# Patient Record
Sex: FEMALE | Race: WHITE | NOT HISPANIC OR LATINO | Employment: UNEMPLOYED | URBAN - METROPOLITAN AREA
[De-identification: names, ages, dates, MRNs, and addresses within clinical notes are randomized per-mention and may not be internally consistent; named-entity substitution may affect disease eponyms.]

---

## 2017-03-29 ENCOUNTER — HOSPITAL ENCOUNTER (INPATIENT)
Facility: HOSPITAL | Age: 62
LOS: 7 days | Discharge: NURSING, REHAB OR LONG-TERM CARE | DRG: 065 | End: 2017-04-05
Attending: EMERGENCY MEDICINE | Admitting: INTERNAL MEDICINE
Payer: MEDICAID

## 2017-03-29 DIAGNOSIS — Z72.0 TOBACCO ABUSE: ICD-10-CM

## 2017-03-29 DIAGNOSIS — I63.9 ISCHEMIC STROKE: ICD-10-CM

## 2017-03-29 DIAGNOSIS — J44.9 CHRONIC OBSTRUCTIVE PULMONARY DISEASE, UNSPECIFIED COPD TYPE: ICD-10-CM

## 2017-03-29 DIAGNOSIS — I63.9 CVA (CEREBRAL VASCULAR ACCIDENT): ICD-10-CM

## 2017-03-29 DIAGNOSIS — I10 ESSENTIAL HYPERTENSION: ICD-10-CM

## 2017-03-29 DIAGNOSIS — I63.9 ACUTE CVA (CEREBROVASCULAR ACCIDENT): ICD-10-CM

## 2017-03-29 DIAGNOSIS — R47.01 EXPRESSIVE APHASIA: Primary | ICD-10-CM

## 2017-03-29 LAB
ALBUMIN SERPL BCP-MCNC: 4.2 G/DL
ALLENS TEST: ABNORMAL
ALP SERPL-CCNC: 128 U/L
ALT SERPL W/O P-5'-P-CCNC: 18 U/L
ANION GAP SERPL CALC-SCNC: 11 MMOL/L
APTT BLDCRRT: 26.9 SEC
AST SERPL-CCNC: 22 U/L
BASOPHILS # BLD AUTO: 0.06 K/UL
BASOPHILS NFR BLD: 0.4 %
BILIRUB SERPL-MCNC: 0.6 MG/DL
BUN SERPL-MCNC: 15 MG/DL
CALCIUM SERPL-MCNC: 9.4 MG/DL
CHLORIDE SERPL-SCNC: 105 MMOL/L
CO2 SERPL-SCNC: 22 MMOL/L
CREAT SERPL-MCNC: 1 MG/DL
DELSYS: ABNORMAL
DIFFERENTIAL METHOD: ABNORMAL
EOSINOPHIL # BLD AUTO: 0.2 K/UL
EOSINOPHIL NFR BLD: 1.5 %
ERYTHROCYTE [DISTWIDTH] IN BLOOD BY AUTOMATED COUNT: 13 %
EST. GFR  (AFRICAN AMERICAN): >60 ML/MIN/1.73 M^2
EST. GFR  (NON AFRICAN AMERICAN): >60 ML/MIN/1.73 M^2
FIO2: 28
FLOW: 2
GLUCOSE SERPL-MCNC: 134 MG/DL
HCO3 UR-SCNC: 23.5 MMOL/L (ref 24–28)
HCT VFR BLD AUTO: 51.1 %
HGB BLD-MCNC: 18.2 G/DL
INR PPP: 1
LYMPHOCYTES # BLD AUTO: 3.5 K/UL
LYMPHOCYTES NFR BLD: 23.8 %
MCH RBC QN AUTO: 31 PG
MCHC RBC AUTO-ENTMCNC: 35.6 %
MCV RBC AUTO: 87 FL
MODE: ABNORMAL
MONOCYTES # BLD AUTO: 1.5 K/UL
MONOCYTES NFR BLD: 10.2 %
NEUTROPHILS # BLD AUTO: 9.3 K/UL
NEUTROPHILS NFR BLD: 64.1 %
PCO2 BLDA: 43.7 MMHG (ref 35–45)
PH SMN: 7.34 [PH] (ref 7.35–7.45)
PLATELET # BLD AUTO: 205 K/UL
PMV BLD AUTO: 10.2 FL
PO2 BLDA: 76 MMHG (ref 80–100)
POC BE: -2 MMOL/L
POC SATURATED O2: 94 % (ref 95–100)
POCT GLUCOSE: 105 MG/DL (ref 70–110)
POTASSIUM SERPL-SCNC: 4.5 MMOL/L
PROT SERPL-MCNC: 7.8 G/DL
PROTHROMBIN TIME: 10.7 SEC
RBC # BLD AUTO: 5.87 M/UL
SAMPLE: ABNORMAL
SITE: ABNORMAL
SODIUM SERPL-SCNC: 138 MMOL/L
TROPONIN I SERPL DL<=0.01 NG/ML-MCNC: <0.006 NG/ML
WBC # BLD AUTO: 14.55 K/UL

## 2017-03-29 PROCEDURE — 96375 TX/PRO/DX INJ NEW DRUG ADDON: CPT

## 2017-03-29 PROCEDURE — 96374 THER/PROPH/DIAG INJ IV PUSH: CPT

## 2017-03-29 PROCEDURE — 11000001 HC ACUTE MED/SURG PRIVATE ROOM

## 2017-03-29 PROCEDURE — 85025 COMPLETE CBC W/AUTO DIFF WBC: CPT

## 2017-03-29 PROCEDURE — 36600 WITHDRAWAL OF ARTERIAL BLOOD: CPT

## 2017-03-29 PROCEDURE — 80053 COMPREHEN METABOLIC PANEL: CPT

## 2017-03-29 PROCEDURE — 82962 GLUCOSE BLOOD TEST: CPT

## 2017-03-29 PROCEDURE — 99202 OFFICE O/P NEW SF 15 MIN: CPT | Mod: GT,,, | Performed by: PSYCHIATRY & NEUROLOGY

## 2017-03-29 PROCEDURE — 99900035 HC TECH TIME PER 15 MIN (STAT)

## 2017-03-29 PROCEDURE — 25000003 PHARM REV CODE 250: Performed by: EMERGENCY MEDICINE

## 2017-03-29 PROCEDURE — 93005 ELECTROCARDIOGRAM TRACING: CPT

## 2017-03-29 PROCEDURE — 85610 PROTHROMBIN TIME: CPT

## 2017-03-29 PROCEDURE — 93010 ELECTROCARDIOGRAM REPORT: CPT | Mod: ,,, | Performed by: INTERNAL MEDICINE

## 2017-03-29 PROCEDURE — 82803 BLOOD GASES ANY COMBINATION: CPT

## 2017-03-29 PROCEDURE — 99285 EMERGENCY DEPT VISIT HI MDM: CPT | Mod: 25

## 2017-03-29 PROCEDURE — 85730 THROMBOPLASTIN TIME PARTIAL: CPT

## 2017-03-29 PROCEDURE — 63600175 PHARM REV CODE 636 W HCPCS: Performed by: EMERGENCY MEDICINE

## 2017-03-29 PROCEDURE — 84484 ASSAY OF TROPONIN QUANT: CPT

## 2017-03-29 RX ORDER — PRAVASTATIN SODIUM 20 MG/1
20 TABLET ORAL DAILY
Status: ON HOLD | COMMUNITY
End: 2017-04-05 | Stop reason: HOSPADM

## 2017-03-29 RX ORDER — ALBUTEROL SULFATE 0.83 MG/ML
2.5 SOLUTION RESPIRATORY (INHALATION) EVERY 6 HOURS PRN
COMMUNITY

## 2017-03-29 RX ORDER — LORAZEPAM 2 MG/ML
1 INJECTION INTRAMUSCULAR
Status: COMPLETED | OUTPATIENT
Start: 2017-03-30 | End: 2017-03-29

## 2017-03-29 RX ORDER — ASPIRIN 300 MG/1
300 SUPPOSITORY RECTAL
Status: COMPLETED | OUTPATIENT
Start: 2017-03-29 | End: 2017-03-29

## 2017-03-29 RX ORDER — LISINOPRIL 20 MG/1
20 TABLET ORAL DAILY
COMMUNITY

## 2017-03-29 RX ORDER — LORAZEPAM 2 MG/ML
1 INJECTION INTRAMUSCULAR
Status: COMPLETED | OUTPATIENT
Start: 2017-03-29 | End: 2017-03-29

## 2017-03-29 RX ORDER — TIOTROPIUM BROMIDE 18 UG/1
18 CAPSULE ORAL; RESPIRATORY (INHALATION) DAILY
COMMUNITY

## 2017-03-29 RX ORDER — FLUTICASONE FUROATE AND VILANTEROL 100; 25 UG/1; UG/1
1 POWDER RESPIRATORY (INHALATION) DAILY
COMMUNITY

## 2017-03-29 RX ADMIN — ASPIRIN 300 MG: 300 SUPPOSITORY RECTAL at 11:03

## 2017-03-29 RX ADMIN — LORAZEPAM 1 MG: 2 INJECTION, SOLUTION INTRAMUSCULAR; INTRAVENOUS at 11:03

## 2017-03-30 PROBLEM — Z72.0 TOBACCO ABUSE: Status: ACTIVE | Noted: 2017-03-30

## 2017-03-30 LAB
ALBUMIN SERPL BCP-MCNC: 3.9 G/DL
ALP SERPL-CCNC: 113 U/L
ALT SERPL W/O P-5'-P-CCNC: 19 U/L
ANION GAP SERPL CALC-SCNC: 14 MMOL/L
AST SERPL-CCNC: 19 U/L
BASOPHILS # BLD AUTO: 0.06 K/UL
BASOPHILS NFR BLD: 0.4 %
BILIRUB SERPL-MCNC: 0.7 MG/DL
BILIRUB UR QL STRIP: ABNORMAL
BUN SERPL-MCNC: 18 MG/DL
CALCIUM SERPL-MCNC: 9.1 MG/DL
CHLORIDE SERPL-SCNC: 106 MMOL/L
CHOLEST/HDLC SERPL: 4.3 {RATIO}
CLARITY UR: CLEAR
CO2 SERPL-SCNC: 21 MMOL/L
COLOR UR: YELLOW
CREAT SERPL-MCNC: 1 MG/DL
DIASTOLIC DYSFUNCTION: YES
DIFFERENTIAL METHOD: ABNORMAL
EOSINOPHIL # BLD AUTO: 0.1 K/UL
EOSINOPHIL NFR BLD: 0.4 %
ERYTHROCYTE [DISTWIDTH] IN BLOOD BY AUTOMATED COUNT: 13.2 %
EST. GFR  (AFRICAN AMERICAN): >60 ML/MIN/1.73 M^2
EST. GFR  (NON AFRICAN AMERICAN): >60 ML/MIN/1.73 M^2
GLUCOSE SERPL-MCNC: 104 MG/DL
GLUCOSE UR QL STRIP: NEGATIVE
HCT VFR BLD AUTO: 47.6 %
HDL/CHOLESTEROL RATIO: 23.2 %
HDLC SERPL-MCNC: 185 MG/DL
HDLC SERPL-MCNC: 43 MG/DL
HGB BLD-MCNC: 17.1 G/DL
HGB UR QL STRIP: NEGATIVE
KETONES UR QL STRIP: ABNORMAL
LDLC SERPL CALC-MCNC: 124.4 MG/DL
LEUKOCYTE ESTERASE UR QL STRIP: NEGATIVE
LYMPHOCYTES # BLD AUTO: 3.1 K/UL
LYMPHOCYTES NFR BLD: 20.1 %
MCH RBC QN AUTO: 31.6 PG
MCHC RBC AUTO-ENTMCNC: 35.9 %
MCV RBC AUTO: 88 FL
MONOCYTES # BLD AUTO: 1.6 K/UL
MONOCYTES NFR BLD: 10.4 %
NEUTROPHILS # BLD AUTO: 10.5 K/UL
NEUTROPHILS NFR BLD: 68.7 %
NITRITE UR QL STRIP: NEGATIVE
NONHDLC SERPL-MCNC: 142 MG/DL
PH UR STRIP: 6 [PH] (ref 5–8)
PLATELET # BLD AUTO: 210 K/UL
PMV BLD AUTO: 11 FL
POTASSIUM SERPL-SCNC: 4.4 MMOL/L
PROT SERPL-MCNC: 7.1 G/DL
PROT UR QL STRIP: ABNORMAL
RBC # BLD AUTO: 5.41 M/UL
RETIRED EF AND QEF - SEE NOTES: 60 (ref 55–65)
SODIUM SERPL-SCNC: 141 MMOL/L
SP GR UR STRIP: >=1.03 (ref 1–1.03)
TRIGL SERPL-MCNC: 88 MG/DL
TSH SERPL DL<=0.005 MIU/L-ACNC: 1.31 UIU/ML
URN SPEC COLLECT METH UR: ABNORMAL
UROBILINOGEN UR STRIP-ACNC: 1 EU/DL
WBC # BLD AUTO: 15.29 K/UL

## 2017-03-30 PROCEDURE — 85025 COMPLETE CBC W/AUTO DIFF WBC: CPT

## 2017-03-30 PROCEDURE — 97167 OT EVAL HIGH COMPLEX 60 MIN: CPT

## 2017-03-30 PROCEDURE — G8987 SELF CARE CURRENT STATUS: HCPCS | Mod: CM

## 2017-03-30 PROCEDURE — 63600175 PHARM REV CODE 636 W HCPCS: Performed by: NURSE PRACTITIONER

## 2017-03-30 PROCEDURE — 63600175 PHARM REV CODE 636 W HCPCS: Performed by: EMERGENCY MEDICINE

## 2017-03-30 PROCEDURE — 94640 AIRWAY INHALATION TREATMENT: CPT

## 2017-03-30 PROCEDURE — G8978 MOBILITY CURRENT STATUS: HCPCS | Mod: CN

## 2017-03-30 PROCEDURE — G8988 SELF CARE GOAL STATUS: HCPCS | Mod: CK

## 2017-03-30 PROCEDURE — 93306 TTE W/DOPPLER COMPLETE: CPT

## 2017-03-30 PROCEDURE — 36415 COLL VENOUS BLD VENIPUNCTURE: CPT

## 2017-03-30 PROCEDURE — 94799 UNLISTED PULMONARY SVC/PX: CPT

## 2017-03-30 PROCEDURE — 25000003 PHARM REV CODE 250: Performed by: INTERNAL MEDICINE

## 2017-03-30 PROCEDURE — 93306 TTE W/DOPPLER COMPLETE: CPT | Mod: 26,,, | Performed by: INTERNAL MEDICINE

## 2017-03-30 PROCEDURE — 94664 DEMO&/EVAL PT USE INHALER: CPT

## 2017-03-30 PROCEDURE — 92610 EVALUATE SWALLOWING FUNCTION: CPT

## 2017-03-30 PROCEDURE — 97535 SELF CARE MNGMENT TRAINING: CPT

## 2017-03-30 PROCEDURE — 80061 LIPID PANEL: CPT

## 2017-03-30 PROCEDURE — 99223 1ST HOSP IP/OBS HIGH 75: CPT | Mod: ,,, | Performed by: NURSE PRACTITIONER

## 2017-03-30 PROCEDURE — G8979 MOBILITY GOAL STATUS: HCPCS | Mod: CM

## 2017-03-30 PROCEDURE — C9113 INJ PANTOPRAZOLE SODIUM, VIA: HCPCS | Performed by: EMERGENCY MEDICINE

## 2017-03-30 PROCEDURE — 25000003 PHARM REV CODE 250: Performed by: EMERGENCY MEDICINE

## 2017-03-30 PROCEDURE — 80053 COMPREHEN METABOLIC PANEL: CPT

## 2017-03-30 PROCEDURE — 99291 CRITICAL CARE FIRST HOUR: CPT | Mod: ,,, | Performed by: PSYCHIATRY & NEUROLOGY

## 2017-03-30 PROCEDURE — 83036 HEMOGLOBIN GLYCOSYLATED A1C: CPT

## 2017-03-30 PROCEDURE — 25000242 PHARM REV CODE 250 ALT 637 W/ HCPCS: Performed by: INTERNAL MEDICINE

## 2017-03-30 PROCEDURE — 25000003 PHARM REV CODE 250: Performed by: NURSE PRACTITIONER

## 2017-03-30 PROCEDURE — 20000000 HC ICU ROOM

## 2017-03-30 PROCEDURE — 27000221 HC OXYGEN, UP TO 24 HOURS

## 2017-03-30 PROCEDURE — 97162 PT EVAL MOD COMPLEX 30 MIN: CPT

## 2017-03-30 PROCEDURE — 81003 URINALYSIS AUTO W/O SCOPE: CPT

## 2017-03-30 PROCEDURE — 97116 GAIT TRAINING THERAPY: CPT

## 2017-03-30 PROCEDURE — 63600175 PHARM REV CODE 636 W HCPCS: Performed by: INTERNAL MEDICINE

## 2017-03-30 PROCEDURE — 84443 ASSAY THYROID STIM HORMONE: CPT

## 2017-03-30 PROCEDURE — 92523 SPEECH SOUND LANG COMPREHEN: CPT

## 2017-03-30 PROCEDURE — 99900035 HC TECH TIME PER 15 MIN (STAT)

## 2017-03-30 RX ORDER — LABETALOL HYDROCHLORIDE 5 MG/ML
10 INJECTION, SOLUTION INTRAVENOUS EVERY 6 HOURS PRN
Status: DISCONTINUED | OUTPATIENT
Start: 2017-03-30 | End: 2017-04-05 | Stop reason: HOSPADM

## 2017-03-30 RX ORDER — ARFORMOTEROL TARTRATE 15 UG/2ML
15 SOLUTION RESPIRATORY (INHALATION) 2 TIMES DAILY
Status: DISCONTINUED | OUTPATIENT
Start: 2017-03-30 | End: 2017-04-05 | Stop reason: HOSPADM

## 2017-03-30 RX ORDER — PRAVASTATIN SODIUM 20 MG/1
40 TABLET ORAL DAILY
Status: DISCONTINUED | OUTPATIENT
Start: 2017-03-30 | End: 2017-04-05 | Stop reason: HOSPADM

## 2017-03-30 RX ORDER — DEXTROSE MONOHYDRATE AND SODIUM CHLORIDE 5; .9 G/100ML; G/100ML
INJECTION, SOLUTION INTRAVENOUS CONTINUOUS
Status: DISCONTINUED | OUTPATIENT
Start: 2017-03-30 | End: 2017-03-30

## 2017-03-30 RX ORDER — ENOXAPARIN SODIUM 100 MG/ML
40 INJECTION SUBCUTANEOUS EVERY 24 HOURS
Status: COMPLETED | OUTPATIENT
Start: 2017-03-30 | End: 2017-04-02

## 2017-03-30 RX ORDER — SODIUM CHLORIDE 9 MG/ML
INJECTION, SOLUTION INTRAVENOUS CONTINUOUS
Status: DISCONTINUED | OUTPATIENT
Start: 2017-03-30 | End: 2017-04-02

## 2017-03-30 RX ORDER — IPRATROPIUM BROMIDE AND ALBUTEROL SULFATE 2.5; .5 MG/3ML; MG/3ML
3 SOLUTION RESPIRATORY (INHALATION) EVERY 4 HOURS
Status: DISCONTINUED | OUTPATIENT
Start: 2017-03-30 | End: 2017-04-05 | Stop reason: HOSPADM

## 2017-03-30 RX ORDER — ONDANSETRON 2 MG/ML
4 INJECTION INTRAMUSCULAR; INTRAVENOUS EVERY 12 HOURS PRN
Status: DISCONTINUED | OUTPATIENT
Start: 2017-03-30 | End: 2017-04-05 | Stop reason: HOSPADM

## 2017-03-30 RX ORDER — LORAZEPAM 2 MG/ML
1 INJECTION INTRAMUSCULAR ONCE
Status: COMPLETED | OUTPATIENT
Start: 2017-03-30 | End: 2017-03-30

## 2017-03-30 RX ORDER — PANTOPRAZOLE SODIUM 40 MG/10ML
40 INJECTION, POWDER, LYOPHILIZED, FOR SOLUTION INTRAVENOUS DAILY
Status: DISCONTINUED | OUTPATIENT
Start: 2017-03-30 | End: 2017-04-05 | Stop reason: HOSPADM

## 2017-03-30 RX ORDER — KETOROLAC TROMETHAMINE 30 MG/ML
15 INJECTION, SOLUTION INTRAMUSCULAR; INTRAVENOUS ONCE
Status: COMPLETED | OUTPATIENT
Start: 2017-03-30 | End: 2017-03-30

## 2017-03-30 RX ORDER — BUDESONIDE 0.5 MG/2ML
0.5 INHALANT ORAL EVERY 12 HOURS
Status: DISCONTINUED | OUTPATIENT
Start: 2017-03-30 | End: 2017-04-05 | Stop reason: HOSPADM

## 2017-03-30 RX ORDER — ASPIRIN 300 MG/1
300 SUPPOSITORY RECTAL DAILY
Status: DISCONTINUED | OUTPATIENT
Start: 2017-03-30 | End: 2017-03-31

## 2017-03-30 RX ORDER — SODIUM CHLORIDE 9 MG/ML
3 INJECTION, SOLUTION INTRAMUSCULAR; INTRAVENOUS; SUBCUTANEOUS EVERY 8 HOURS
Status: DISCONTINUED | OUTPATIENT
Start: 2017-03-30 | End: 2017-03-30

## 2017-03-30 RX ADMIN — PANTOPRAZOLE SODIUM 40 MG: 40 INJECTION, POWDER, FOR SOLUTION INTRAVENOUS at 09:03

## 2017-03-30 RX ADMIN — SODIUM CHLORIDE: 0.9 INJECTION, SOLUTION INTRAVENOUS at 01:03

## 2017-03-30 RX ADMIN — ARFORMOTEROL TARTRATE 15 MCG: 15 SOLUTION RESPIRATORY (INHALATION) at 08:03

## 2017-03-30 RX ADMIN — ASPIRIN 300 MG: 300 SUPPOSITORY RECTAL at 12:03

## 2017-03-30 RX ADMIN — IPRATROPIUM BROMIDE AND ALBUTEROL SULFATE 3 ML: .5; 3 SOLUTION RESPIRATORY (INHALATION) at 05:03

## 2017-03-30 RX ADMIN — ENOXAPARIN SODIUM 40 MG: 100 INJECTION SUBCUTANEOUS at 04:03

## 2017-03-30 RX ADMIN — KETOROLAC TROMETHAMINE 15 MG: 30 INJECTION, SOLUTION INTRAMUSCULAR; INTRAVENOUS at 06:03

## 2017-03-30 RX ADMIN — BUDESONIDE 0.5 MG: 0.5 SUSPENSION RESPIRATORY (INHALATION) at 08:03

## 2017-03-30 RX ADMIN — IPRATROPIUM BROMIDE AND ALBUTEROL SULFATE 3 ML: .5; 3 SOLUTION RESPIRATORY (INHALATION) at 11:03

## 2017-03-30 RX ADMIN — SODIUM CHLORIDE: 0.9 INJECTION, SOLUTION INTRAVENOUS at 04:03

## 2017-03-30 RX ADMIN — IPRATROPIUM BROMIDE AND ALBUTEROL SULFATE 3 ML: .5; 3 SOLUTION RESPIRATORY (INHALATION) at 01:03

## 2017-03-30 RX ADMIN — IPRATROPIUM BROMIDE AND ALBUTEROL SULFATE 3 ML: .5; 3 SOLUTION RESPIRATORY (INHALATION) at 08:03

## 2017-03-30 RX ADMIN — IPRATROPIUM BROMIDE AND ALBUTEROL SULFATE 3 ML: .5; 3 SOLUTION RESPIRATORY (INHALATION) at 07:03

## 2017-03-30 RX ADMIN — LORAZEPAM 1 MG: 2 INJECTION, SOLUTION INTRAMUSCULAR; INTRAVENOUS at 12:03

## 2017-03-30 RX ADMIN — ARFORMOTEROL TARTRATE 15 MCG: 15 SOLUTION RESPIRATORY (INHALATION) at 09:03

## 2017-03-30 RX ADMIN — IPRATROPIUM BROMIDE AND ALBUTEROL SULFATE 3 ML: .5; 3 SOLUTION RESPIRATORY (INHALATION) at 03:03

## 2017-03-30 RX ADMIN — BUDESONIDE 0.5 MG: 0.5 SUSPENSION RESPIRATORY (INHALATION) at 09:03

## 2017-03-30 RX ADMIN — LABETALOL HYDROCHLORIDE 10 MG: 5 INJECTION, SOLUTION INTRAVENOUS at 10:03

## 2017-03-30 NOTE — SUBJECTIVE & OBJECTIVE
Interval History: Patient s/p MRI demonstrating an acute CVA. CM evaluating for Rehab    Review of Systems   Unable to perform ROS: Acuity of condition     Objective:     Vital Signs (Most Recent):  Temp: 97.5 °F (36.4 °C) (03/30/17 0700)  Pulse: 97 (03/30/17 1328)  Resp: (!) 25 (03/30/17 1328)  BP: (!) 177/99 (03/30/17 0900)  SpO2: (!) 93 % (03/30/17 1328) Vital Signs (24h Range):  Temp:  [97.5 °F (36.4 °C)-98.3 °F (36.8 °C)] 97.5 °F (36.4 °C)  Pulse:  [] 97  Resp:  [15-31] 25  SpO2:  [93 %-98 %] 93 %  BP: (113-202)/() 177/99     Weight: 87.6 kg (193 lb 2 oz)  Body mass index is 37.72 kg/(m^2).    Intake/Output Summary (Last 24 hours) at 03/30/17 1356  Last data filed at 03/30/17 1000   Gross per 24 hour   Intake           626.25 ml   Output              305 ml   Net           321.25 ml      Physical Exam   Constitutional: She is oriented to person, place, and time. She appears well-developed and well-nourished.   + Expressive Aphasia   HENT:   Head: Normocephalic and atraumatic.   Eyes: EOM are normal. Pupils are equal, round, and reactive to light.   Neck: Normal range of motion. Neck supple. No JVD present.   Cardiovascular: Normal heart sounds and intact distal pulses.  Exam reveals no gallop and no friction rub.    No murmur heard.  IRR IRR   Pulmonary/Chest: Effort normal and breath sounds normal. No respiratory distress. She has no wheezes.   Abdominal: Soft. Bowel sounds are normal. She exhibits no distension.   Musculoskeletal: Normal range of motion. She exhibits no edema or tenderness.   + L Sided Discoordination.    Neurological: She is alert and oriented to person, place, and time. No cranial nerve deficit. She exhibits abnormal muscle tone. Coordination abnormal.   Skin: Skin is warm and dry. She is not diaphoretic. No erythema.   Psychiatric:   Emotional Tearful   Nursing note and vitals reviewed.      Significant Labs:   BMP:   Recent Labs  Lab 03/30/17  0431         K 4.4       CO2 21*   BUN 18   CREATININE 1.0   CALCIUM 9.1     CBC:   Recent Labs  Lab 03/29/17 2145 03/30/17  0431   WBC 14.55* 15.29*   HGB 18.2* 17.1*   HCT 51.1* 47.6    210     CMP:   Recent Labs  Lab 03/29/17 2145 03/30/17  0431    141   K 4.5 4.4    106   CO2 22* 21*   * 104   BUN 15 18   CREATININE 1.0 1.0   CALCIUM 9.4 9.1   PROT 7.8 7.1   ALBUMIN 4.2 3.9   BILITOT 0.6 0.7   ALKPHOS 128 113   AST 22 19   ALT 18 19   ANIONGAP 11 14   EGFRNONAA >60 >60     Cardiac Markers: No results for input(s): CKMB, MYOGLOBIN, BNP, TROPISTAT in the last 48 hours.  Troponin:   Recent Labs  Lab 03/29/17 2145   TROPONINI <0.006     All pertinent labs within the past 24 hours have been reviewed.    Significant Imaging: MRI  Impression       Acute CVA in the medial aspect of the left thalamus and in the right anterior aspect of the britney and centrally within the britney.  Only a diffusion scan was able to be performed on this patient.

## 2017-03-30 NOTE — EICU
Admitted with acute ischemic stroke and for neurological monitoring  No acute distress or neurological dterioration  Continue to observe

## 2017-03-30 NOTE — PLAN OF CARE
Problem: Patient Care Overview  Goal: Plan of Care Review  Outcome: Ongoing (interventions implemented as appropriate)  Patient has rested off and on in between care.  Neurologically pt has remained the same since she arrived on the unit (see previous note).  This AM however she's slightly more alert.  Pupils remain equal and reactive. Plans for MRI this AM.  O2 sats via pulse oximetry >92% on 2lpm NC.  NSR on telemetry monitor.  Permissive HTN being allowed for a systolic up to 220.  Patient being turned and repositioned q 2 hours to prevent skin breakdown.

## 2017-03-30 NOTE — ASSESSMENT & PLAN NOTE
"- - With expressive aphasia  - Not a TPA candidate "out of treatment window"  - Aspirin rectally, Statin  - MRI Brain, MRA Head/Neck + For CVA  - Rehab consult  - Neurology consult  - NPO   - PT/OT/ST  - Allow permissivehypertension  "

## 2017-03-30 NOTE — SUBJECTIVE & OBJECTIVE
Past Medical History:   Diagnosis Date    COPD (chronic obstructive pulmonary disease)     HTN (hypertension)     Hypercholesteremia        History reviewed. No pertinent surgical history.    Review of patient's allergies indicates:  No Known Allergies    No current facility-administered medications on file prior to encounter.      No current outpatient prescriptions on file prior to encounter.     Family History     Reviewed and Not Pertinent        Social History Main Topics    Smoking status: Current Every Day Smoker    Smokeless tobacco: Not on file    Alcohol use No    Drug use: No    Sexual activity: Not on file     Review of Systems   Unable to perform ROS: Mental status change     Objective:     Vital Signs (Most Recent):  Temp: 97.8 °F (36.6 °C) (03/29/17 2140)  Pulse: (!) 113 (03/29/17 2322)  Resp: (!) 25 (03/29/17 2322)  BP: (!) 145/114 (03/29/17 2322)  SpO2: (!) 94 % (03/29/17 2322) Vital Signs (24h Range):  Temp:  [97.8 °F (36.6 °C)] 97.8 °F (36.6 °C)  Pulse:  [104-113] 113  Resp:  [18-28] 25  SpO2:  [94 %-96 %] 94 %  BP: (145-202)/() 145/114     Weight: 78 kg (172 lb)  Body mass index is 33.59 kg/(m^2).    Physical Exam   Constitutional: She appears well-developed and well-nourished. No distress.   HENT:   Head: Normocephalic and atraumatic.   Eyes: Conjunctivae are normal. No scleral icterus.   Neck: Normal range of motion. Neck supple. No JVD present. No tracheal deviation present. No thyromegaly present.   Cardiovascular: Normal rate, regular rhythm, normal heart sounds and intact distal pulses.    No murmur heard.  Pulmonary/Chest: Effort normal and breath sounds normal. No respiratory distress. She has no wheezes. She has no rales. She exhibits no tenderness.   Abdominal: Soft. She exhibits no distension. There is no tenderness.   Musculoskeletal: Normal range of motion. She exhibits no edema or tenderness.   Lymphadenopathy:     She has no cervical adenopathy.   Neurological: She is  alert. No cranial nerve deficit. She exhibits normal muscle tone. Coordination normal.   Expressive aphasia   Skin: Skin is warm and dry. She is not diaphoretic. No erythema.   Nursing note and vitals reviewed.       Significant Labs:   BMP:   Recent Labs  Lab 03/29/17 2145   *      K 4.5      CO2 22*   BUN 15   CREATININE 1.0   CALCIUM 9.4     CBC:   Recent Labs  Lab 03/29/17 2145   WBC 14.55*   HGB 18.2*   HCT 51.1*        CMP:   Recent Labs  Lab 03/29/17 2145      K 4.5      CO2 22*   *   BUN 15   CREATININE 1.0   CALCIUM 9.4   PROT 7.8   ALBUMIN 4.2   BILITOT 0.6   ALKPHOS 128   AST 22   ALT 18   ANIONGAP 11   EGFRNONAA >60     All pertinent labs within the past 24 hours have been reviewed.    Significant Imaging:   Imaging Results         X-Ray Chest AP Portable (Final result) Result time:  03/29/17 22:15:13    Final result by Lino Fonseca III, MD (03/29/17 22:15:13)    Impression:    . Normal heart size. Clear lungs with chronic changes suggested.      Electronically signed by: LINO FONSECA MD  Date:     03/29/17  Time:    22:15     Narrative:    Chest x-ray, single view.    Clinical indication: Stroke.    Normal heart size. There is coarsening of the interstitial markings presumably chronic in nature. No consolidation. No effusion.            CT Head Without Contrast (Final result) Result time:  03/29/17 22:07:44    Final result by Lino Fonseca III, MD (03/29/17 22:07:44)    Impression:     No bleed or other acute intracranial event suggested.        I immediately called these results to the emergency room physician at the time of the interpretation as requested.    All CT scans at this facility use dose modulation, iterative reconstruction, and/or weight based dosing when appropriate to reduce radiation dose to as low as reasonably achievable.      Electronically signed by: LINO FONSECA MD  Date:     03/29/17  Time:    22:07      Narrative:    CT of the head without contrast.    Clinical indication: aphasia.     Standard noncontrast CT scan of the brain. No previous for comparison.    The brain and ventricles show mild changes of atrophy. The scan is slightly degraded by motion artifact. Minimal white matter changes are present. No hemorrhage, mass lesion, or hydrocephalus.   No depressed skull fracture.

## 2017-03-30 NOTE — PT/OT/SLP EVAL
Occupational Therapy  Evaluation    Christine Mcgill   MRN: [id number]   Admitting Diagnosis: Acute CVA (cerebrovascular accident)    OT Date of Treatment: 03/30/17   OT Start Time: 0905  OT Stop Time: 0930  OT Total Time (min): 25 min    Billable Minutes:  Evaluation 15 minutes  Self Care/Home Management 10 minutes    Diagnosis: Acute CVA (cerebrovascular accident)   Impaired adl's, decrease functional mobility, impaired t/f's l side weakness    Past Medical History:   Diagnosis Date    COPD (chronic obstructive pulmonary disease)     HTN (hypertension)     Hypercholesteremia       History reviewed. No pertinent surgical history.    Referring physician: dr. Olsen  Date referred to OT: 3-29-17    General Precautions: Standard, aphasia, aspiration, fall  Orthopedic Precautions: N/A  Braces:            Patient History:  Living Environment  Lives With: spouse  Living Arrangements: house  Home Layout: Able to live on 1st floor  Stair Railings at Home: none  Transportation Available: car  Living Environment Comment: plof (i) with all adl's, functional mobility and t/f's.    Prior level of function:   Bed Mobility/Transfers: independent  Grooming: independent  Bathing: independent  Upper Body Dressing: independent  Lower Body Dressing: independent  Toileting: independent  Home Management Skills: independent  Driving License: Yes     Dominant hand: right    Subjective:  Communicated with nurse and epic chart review prior to session.    Chief Complaint: Impaired adl's, decrease functional mobility, impaired t/f's l side weakness    Patient/Family stated goals:     Pain Rating:  (unable to assess)                   Objective:  Patient found with: blood pressure cuff, telemetry, oxygen, pulse ox (continuous)    Cognitive Exam:  Oriented to: Person, Place, Time and Situation  Follows Commands/attention: Easily distracted  Communication: clear/fluent  Memory:  No Deficits noted  Safety awareness/insight to disability:  intact  Coping skills/emotional control: TEARFUL/ INAPPROPRIATE    Visual/perceptual:  Intact    Physical Exam:  Postural examination/scapula alignment: Rounded shoulder and Head forward  Skin integrity: Visible skin intact and Thin  Edema: None noted     Sensation:   Intact    Upper Extremity Range of Motion:  Right Upper Extremity: WFL  Left Upper Extremity: aarom wfl    Upper Extremity Strength:  Right Upper Extremity: mmt: 4/5 grossly  Left Upper Extremity: mmt: 2/5 grossly   Strength: r ue  wfl and l ue 3/5 grossly    Fine motor coordination:   Impaired l ue    Gross motor coordination: impaired lue    Functional Mobility:  Bed Mobility:  Rolling/Turning to Left: Maximum assistance, With assist of 2  Rolling/Turning Right: Maximum assistance, With assist of 2  Scooting/Bridging: Maximum Assistance  Supine to Sit: Maximum Assistance, With assist of 2    Transfers:  Sit <> Stand Assistance: Maximum Assistance (x2)  Sit <> Stand Assistive Device: Rolling Walker  Bed <> Chair Technique: Stand Pivot  Bed <> Chair Transfer Assistance: Maximum Assistance (x2)    Functional Ambulation: total a  With rw. Pt unable to take follow trough stepps    Activities of Daily Living:     Feeding adaptive equipment: na  UE Dressing Level of Assistance: Maximum assistance  UE adaptive equipment: none  LE Dressing Level of Assistance: Maximum assistance  LE adaptive equipment: none        Toileting Where Assessed: Toilet  Toileting Level of Assistance: Total assistance        Bathing adaptive equipment: na    Balance:   Static Sit: POOR: Needs MODERATE assist to maintain  Dynamic Sit: POOR: N/A  Static Stand: 0: Needs MAXIMAL assist to maintain   Dynamic stand: 0: N/A    Therapeutic Activities and Exercises:  Pt / family educated on rom exercise with l ue/le exercise. Family verbalized understanding    AM-PAC 6 CLICK ADL  How much help from another person does this patient currently need?  1 = Unable, Total/Dependent  "Assistance  2 = A lot, Maximum/Moderate Assistance  3 = A little, Minimum/Contact Guard/Supervision  4 = None, Modified [place]/Independent         AM-PAC Raw Score CMS "G-Code Modifier Level of Impairment Assistance   6 % Total / Unable   7 - 9 CM 80 - 100% Maximal Assist   10 - 14 CL 60 - 80% Moderate Assist   15 - 19 CK 40 - 60% Moderate Assist   20 - 22 CJ 20 - 40% Minimal Assist   23 CI 1-20% SBA / CGA   24 CH 0% Independent/ Mod I       Patient left up in chair with all lines intact, call button in reach, nurse Beena notified and family/ daughter present    Assessment:  Christine Mcgill is a 61 y.o. female with a medical diagnosis of Acute CVA (cerebrovascular accident) and presents with Impaired adl's, decrease functional mobility, impaired t/f's, and  l side weakness. Pt sat eob with posterior lateral lean to left during therapeutic activity. Pt req max/ mod a with sitting balance. Pt will benefit from skilled o.t. To increase (i) with her impairements.    Rehab identified problem list/impairments: Rehab identified problem list/impairments: weakness, impaired functional mobilty, impaired balance, decreased upper extremity function, decreased safety awareness, impaired endurance, impaired self care skills, gait instability, decreased coordination, decreased lower extremity function, decreased ROM    Rehab potential is good.    Activity tolerance: Good    Discharge recommendations: Discharge Facility/Level Of Care Needs: rehabilitation facility     Barriers to discharge:      Equipment recommendations: walker, rolling     GOALS:   Occupational Therapy Goals        Problem: Occupational Therapy Goal    Goal Priority Disciplines Outcome Interventions   Occupational Therapy Goal     OT, PT/OT     Description:  ot goals to be met by 4-7-17  1. Pt will tolerate 1 set x 10 reps r ue arom and l ue aarom exercise  2. Min a with ue dressing  3. Min a with le dressing                 PLAN:  Patient to be seen " 3 x/week to address the above listed problems via self-care/home management, therapeutic exercises, therapeutic activities  Plan of Care expires: 04/06/17  Plan of Care reviewed with: patient         Katerine Patterson, OT  03/30/2017

## 2017-03-30 NOTE — PT/OT/SLP EVAL
Physical Therapy  Evaluation    Christine Mcgill   MRN: [id number]   Admitting Diagnosis: Acute CVA (cerebrovascular accident)    PT Received On: 17  PT Start Time: 0955     PT Stop Time: 1020    PT Total Time (min): 25 min       Billable Minutes:  Evaluation 15 and Therapeutic Activity 10    Diagnosis: Acute CVA (cerebrovascular accident)  PT DX: L SIDED WEAKNESS, CVA    Past Medical History:   Diagnosis Date    COPD (chronic obstructive pulmonary disease)     HTN (hypertension)     Hypercholesteremia       History reviewed. No pertinent surgical history.    Referring physician: DALE  Date referred to PT: 3/30/2017      General Precautions: Standard, aspiration, fall  Orthopedic Precautions:     Braces:              Patient History:  Lives With: spouse  Living Arrangements: house  Home Accessibility: stairs to enter home  Number of Stairs to Enter Home: 3  Stair Railings at Home: outside, present at both sides  Transportation Available: family or friend will provide  Living Environment Comment: PT WAS LIVING AT HOME AND IND WITH ALL [employer] MOBILITY   Equipment Currently Used at Home: none  DME owned (not currently used): none    Previous Level of Function:  Ambulation Skills: independent  Transfer Skills: independent  ADL Skills: independent    Subjective:  Communicated with NURSE YULI AND EPIC CHART REVIEW  prior to session.   PT AGREED TO EVAL AND TX . PT EMOTIONALLY LABILE  Chief Complaint: CRYING  Patient goals: RETURN TO INDEPENDENCE      Pain Ratin/10               Pain Rating Post-Intervention: 0/10    Objective:   Patient found with: peripheral IV, alvarez catheter, oxygen, telemetry, pulse ox (continuous)     Cognitive Exam:  Oriented to: Person and Place    Follows Commands/attention: Inattentive, Easily distracted and Follows one-step commands  Communication: expressive aphasia  Safety awareness/insight to disability: impaired    Physical Exam:  Postural examination/scapula alignment: Rounded  shoulder and Head forward    Skin integrity: Visible skin intact  Edema: None noted     Sensation:   Intact, L LE ASSESSMENT     Lower Extremity Range of Motion:  Right Lower Extremity: WFL  Left Lower Extremity: PROM WFL, NONPERPOSEFUL AROM WFL    Lower Extremity Strength:  Right Lower Extremity: WFL  Left Lower Extremity: SEVERE LIMITED       Gross motor coordination: IMPAIRED    Functional Mobility:  Bed Mobility:  Rolling/Turning to Left: Maximum assistance  Scooting/Bridging: Maximum Assistance  Supine to Sit: Maximum Assistance (X2)    Transfers:  Sit <> Stand Assistance: Maximum Assistance (X2 WITH LEFT LATERAL LEAN)  Sit <> Stand Assistive Device: Rolling Walker  Bed <> Chair Technique: Stand Pivot  Bed <> Chair Assistance: Maximum Assistance (X2)  Bed <> Chair Assistive Device: Rolling Walker    Gait:   Gait Distance: UNABLE    Balance:   Static Sit: POOR: Needs MODERATE assist to maintain  Dynamic Sit: 0: N/A  Static Stand: 0: Needs MAXIMAL assist to maintain   Dynamic stand: 0: N/A    Therapeutic Activities and Exercises:  PT SEATED EOB WITH LEFT LATERAL LEAN. PT STOOD X 2 TRIALS WITH MAX A X 2 AND LEFT LATERAL LEAN WITH TACTILE CUES TO FACILITATE  OF L UE TO RW. PT T/F TO CHAIR WITH MAX A X 2 WITHOUT USE/ ADVANCEMENT OF L LE. PT LEFT SEATED IN CHAIR WITH FAMILY PRESENT. PT FAMILY EDUCATED ON ROM TO DEC CONTRACTURES AND BED SORES. PT FAMILY REPORTED UNDERSTANDING.     AM-PAC 6 CLICK MOBILITY  How much help from another person does this patient currently need?   1 = Unable, Total/Dependent Assistance  2 = A lot, Maximum/Moderate Assistance  3 = A little, Minimum/Contact Guard/Supervision  4 = None, Modified [place]/Independent          AM-PAC Raw Score CMS G-Code Modifier Level of Impairment Assistance   6 % Total / Unable   7 - 9 CM 80 - 100% Maximal Assist   10 - 14 CL 60 - 80% Moderate Assist   15 - 19 CK 40 - 60% Moderate Assist   20 - 22 CJ 20 - 40% Minimal Assist   23 CI 1-20% SBA /  CGA   24 CH 0% Independent/ Mod I     Patient left up in chair with call button in reach, NURSE  notified and FAMILY present.    Assessment:   Christine Mcgill is a 61 y.o. female with a medical diagnosis of Acute CVA (cerebrovascular accident) and presents with L UE/LE WEAKNESS, BALANCE IMPAIRMENTS LIMITING GROSS FUNC MOBILITY. PT WILL BENEFIT FROM P.T. TO ADDRESS IMPAIRMENTS.    Rehab identified problem list/impairments: Rehab identified problem list/impairments: weakness, impaired endurance, impaired self care skills, impaired functional mobilty, gait instability, impaired balance, decreased safety awareness, decreased lower extremity function, decreased upper extremity function, decreased coordination, impaired cognition, abnormal tone, decreased ROM, impaired coordination    Rehab potential is good.    Activity tolerance: Poor    Discharge recommendations: Discharge Facility/Level Of Care Needs: rehabilitation facility     Barriers to discharge: Barriers to Discharge: Decreased caregiver support, Inaccessible home environment    Equipment recommendations:       GOALS:   Physical Therapy Goals        Problem: Physical Therapy Goal    Goal Priority Disciplines Outcome Goal Variances Interventions   Physical Therapy Goal     PT/OT, PT      Description:  PT WILL BE SEEN FOR P.T. FOR A MIN OF 5 OUT OF 7 DAYS A WEEK  LT17  1. PT WILL COMPLETE BED MOBILITY WITH MOD A.   2. PT WILL STAND WITH RW AND MOD A FOR T/F AND PRE-GT  3. PT WILL COMPLETE B LE TE X 10 REPS FOR ROM AND STRENGTHENING.                PLAN:    Patient to be seen   to address the above listed problems via gait training, therapeutic exercises, therapeutic activities, neuromuscular re-education  Plan of Care expires: 17  Plan of Care reviewed with: patient    Functional Assessment Tool Used: [place] AM PAC  Score: CN  Functional Limitation: Mobility: Walking and moving around  Mobility: Walking and Moving Around Current Status ():  CN  Mobility: Walking and Moving Around Goal Status (): MITUL Dumont, PT  03/30/2017

## 2017-03-30 NOTE — NURSING
Left for with pt on cardiac monitor via MRI stretcher, accompanied by MRI techs x 2; attempted to perform MRI/MRA; pt not able to stay still; admin Ativan 1mg IVP at 12:08; no change; pt continues to move around in MRI machine; returned to room at 12:30; Guadalupe Cope NP informed; family informed; verbalized understanding.

## 2017-03-30 NOTE — PLAN OF CARE
Problem: Patient Care Overview  Goal: Plan of Care Review  PT REQUIRES MAX A X 2 FOR BED MOBILITY AND T/F TO CHAIR.   Outcome: Ongoing (interventions implemented as appropriate)  Pt unable to remain still during MRI; Guadalupe Cope, NP informed; pt Ox4; slurred speech; unconsolable crying at times; family at bedside most of day except when pt asleep; pt c/o pain to LUE; admin Toradol 15mg IVP; pt more relaxed; pt to be discharged to in-patient rehab facility at some point.

## 2017-03-30 NOTE — ED NOTES
Pt's daughter at bs, reports she was notified at approx 2035 tonight that pt was exhibiting ssx and called ambulance. Per pt's , pt last spoke normally at approx 0900 this morning and then slept for the majority of the day. Pt aaox4 at this time but continues to exhibit tearful agitation and anxiety. Pt occasionally able to verbalize wants, some words slurred but understandable. Family at .

## 2017-03-30 NOTE — PLAN OF CARE
Problem: Patient Care Overview  Goal: Plan of Care Review  Outcome: Ongoing (interventions implemented as appropriate)  Patient on 2lnc, insp/exp wheezing. Patient tolerated neb tx well, will follow. spo2 95%.

## 2017-03-30 NOTE — CONSULTS
[facility]  Tele-Consult    Consultation started: 3/29/2017 at 10:34 PM   Consulting Provider:   Dr. Cunningham  The patient location is [facility]  Spoke hospital nurse at bedside with patient assisting consultant.     Subjective:   Subjective   History of Present Illness:  Christine Mcgill is a 61 y.o. female who presents with trouble speaking.  She was last seen nl by  at 9a.  Having trouble speaking.  She confirms onset around 9a  Wake up Stroke?: yes  Last known normal:  9a  Recent bleeding noted: no  Does the patient take any Blood Thinners? no           Past Medical History:   Diagnosis Date    COPD (chronic obstructive pulmonary disease)     HTN (hypertension)     Hypercholesteremia        H&P was obtained from patient and relative(s).    Medications: No current facility-administered medications for this encounter.     Current Outpatient Prescriptions:     albuterol (PROVENTIL) 2.5 mg /3 mL (0.083 %) nebulizer solution, Take 2.5 mg by nebulization every 6 (six) hours as needed for Wheezing. Rescue, Disp: , Rfl:     fluticasone-vilanterol (BREO ELLIPTA) 100-25 mcg/dose diskus inhaler, Inhale 1 puff into the lungs once daily. Controller, Disp: , Rfl:     lisinopril (PRINIVIL,ZESTRIL) 20 MG tablet, Take 20 mg by mouth once daily., Disp: , Rfl:     pravastatin (PRAVACHOL) 20 MG tablet, Take 20 mg by mouth once daily., Disp: , Rfl:     tiotropium (SPIRIVA WITH HANDIHALER) 18 mcg inhalation capsule, Inhale 18 mcg into the lungs once daily. Controller, Disp: , Rfl:     Allergies: Review of patient's allergies indicates:  No Known Allergies    Objective:     Vitals:   Vitals:    03/29/17 2207   BP: (!) 156/98   Pulse:    Resp:    Temp:         Objective   Physical Exam:  General: well developed, well nourished   HENT: Head:normocephalic and atraumatic   HENT: Ears: right ear normal and left ear normal  Nose: normal nares and no  discharge  Eyes:conjunctivae/corneas clear. PERRL.  Neck: normal, no obvious masses and trachea to midline  Lungs: normal respiratory effort  Cardiovascular: Heart: regular rate and rhythm   Cardiovascular: Extremities: no cyanosis, no edema and no clubbing  Abdomen: appears normal and not distended  Skin:  skin color and texture normal, no rash and no bruises  Musculoskeletal: normal range of motion in all extremities  Psych/Behavioral: appropriate affect       Imaging Notes: No hemmorhage. No mass effect. No early infarct signs. Impression performed at: 10:39p    NIH Stroke Scale:  Interval: baseline (upon arrival/admit)  Level of Consciousness: 0 - alert  LOC Questions: 0 - answers both correctly  LOC Commands: 0 - performs both correctly  Best Gaze: 0 - normal  Visual: 0 - no visual loss  Facial Palsy: 0 - normal  Motor Left Arm: 0 - no drift  Motor Right Arm: 0 - no drift  Motor Left Le - no drift  Motor Right Le - no drift  Limb Ataxia: 0 - absent  Sensory: 1 - mild to moderate loss (decreased on L)  Best Language: 0 - no aphasia  Dysarthria: 2 - near to unintelligible  Extinction and Inattention: 0 - no neglect  NIH Stroke Scale Total: 3        Assessment - Diagnosis - Goals:     Plan     Diagnosis/Impression: Unspecified intracranial artery occlusion with stroke (163.9)    TPA Recommendation: TPA not recommended due to Outside of treament window    Recommendation: NPO until after pass dysphagia screen. Diagnostic studies: HgbA1C to assess blood glucose levels, Lipid Profile to assess cholesterol levels, MRA head to assess vasculature, MRA neck/arch to assess vasculature, MRI head without contrast to assess brain parenchyma, Trans-thoracic cardiac echo to assess cardiac function/status  Consults: Rehab Consult; Occupational Therapy, Rehab Consult; Physical Therapy and Rehab Consult; Speech Therapy  Antithrombotics: Aspirin: 325 mg oral daily  Statins: Atorvastatin- 40 mg oral daily  ASA 325mg po if  passes swallow screen    Disposition Recommendation:  admit to inpatient       Possible Interventional Revascularization Candidate? NIHSS only 3    Recommended the emergency room physician to have a brief discussion with the patient and/or family if available regarding the risks and benefits of treatment, and to briefly document the occurrence of that discussion in his clinical encounter note.     The attending portion of this evaluation, treatment, and documentation was performed per Aldo Culp via audiovisual.      Billing code:  (non-intervention mild to moderate stroke, TIA, some mimics)    · This patient has a critical neurological condition/illness, with some potential for high morbidity and mortality.  · There is a moderate probability for acute neurological change leading to clinical and possibly life-threatening deterioration requiring highest level of physician preparedness for urgent intervention.  · Care was coordinated with other physicians involved in the patient's care.  · Radiologic studies and laboratory data were reviewed and interpreted, and plan of care was re-assessed based on the results.  Diagnosis, treatment options and prognosis may have been discussed with the patient and/or family members or caregiver.      Consultation ended: 3/29/2017 at 10:43p    Aldo Culp MD

## 2017-03-30 NOTE — ED NOTES
Spoke with [facility] regarding patient consult, will call as soon as Dr. Culp is available.

## 2017-03-30 NOTE — ED NOTES
US at  attempting carotid study. Pt writhing in bed and moaning, unable to be consoled to still position for exam, HTN noted. Dr. Cunningham notified, awaiting new orders.

## 2017-03-30 NOTE — PT/OT/SLP EVAL
Speech Language Pathology Evaluation    Christine Mcgill   MRN: [id number]   Admitting Diagnosis: Acute CVA (cerebrovascular accident)    Diet recommendations: Solid Diet Level: NPO  Liquid Diet Level: NPO     SLP Treatment Date: 03/30/17  Speech Start Time: 1045     Speech Stop Time: 1115     Speech Total (min): 30 min       TREATMENT BILLABLE MINUTES:  Eval 20  and Eval Swallow and Oral Function 10    Diagnosis: Acute CVA (cerebrovascular accident)    Past Medical History:   Diagnosis Date    COPD (chronic obstructive pulmonary disease)     HTN (hypertension)     Hypercholesteremia      History reviewed. No pertinent surgical history.    Has the patient been evaluated by SLP for swallowing? : Yes  Keep patient NPO?: Yes   General Precautions: Standard, aphasia, aspiration, fall    Current Respiratory Status: nasal cannula     Social Hx: Patient lives by herself.  Family reports pt being Independent with all ADL's prior to admit.      Prior diet: regular diet.    Occupational/hobbies/homemaking: none reported.    Subjective:  Pt was seen sitting in a chair at bedside with family present.  Pt emotionally incontinent.     Patient goals: pt did not state.    Pain Rating:  (Pt reported a headache but unable to assign rating.  nursing aware)     Objective:   Patient found with: blood pressure cuff, oxygen, peripheral IV, pulse ox (continuous), telemetry    Oral Musculature Evaluation  Oral Musculature: facial asymmetry present, general weakness  Secretion Management: left corner drooling     Cognitive Status:  Behavioral Observations: distractible, inpulsive, inappropriate and emotional-  Memory and Orientation: pt oriented, however unable to fully assess memory skills due to pt's aphasia  Attention: pt easily distracted and required mod cueing for redirection  Problem Solving: unable to completely assess due to crying and aphasia  Pragmatics: emotionally inconctinent and initiation problems  Executive Function: Pt is  aware of her deficits with reduced initiation    Language: delayed.  Pt able to verbalize automatic statements at times.      Auditory Comprehension: able to identify objects, answers yes/no questions and able to follow commands    Verbal Expression: significant expressive aphasia present with pt becoming upset and crying when attempting to produce purposeful speech    Motor Speech: dyarthria with speech intelligibility at 30%      Voice/breath support: during eval pt experienced decreased breathing coordination with pt holding her breath followed by blowing the air out of her mouth.  pt also experienced expiratory wheezing    Augmentative Alternative Communication:    Pt refused to attempt writing or picture communication    Reading: DNT    Writing: DNT    Visual-Spatial: Vision does not appear to be effected     Bedside Swallow Eval:  Consistencies Assessed: thin liquids and puree  Oral Phase: oral holding while pt held her breath, anterior spillage when pt would blow her breath out  Pharyngeal Phase: swallow dealy , coughing/choking and decreased hyolaryngeal excursion    Assessment:  Christine Mcgill is a 61 y.o. female with a diagnosis of CVA  presents with expressive aphasia, dysarthria, and dysphagia.  Pt is not candidate for po intake at this time due to signs of aspiration and decrease breathing and swallowing coordination.  Pt will require much encouragement for participation in ST due to her uncontrolled emotions.       Discharge recommendations: Discharge Facility/Level Of Care Needs: rehabilitation facility     Goals:   SLP Goals        Problem: SLP Goal    Goal Priority Disciplines Outcome   SLP Goal     SLP    Description:  1. Ongoing swallow assessment with MBSS when warranted to establish safest diet  2. Pt will complete expressive speech tasks with mod cueing for word finding  3. Pt will attend to tasks for 2 minutes with min cueing  4. Pt will complete oral and pharyngeal ex's with min cueing                  Plan:   Patient to be seen Therapy Frequency: 5 x/week   Plan of Care expires: 04/06/17  Plan of Care reviewed with: patient, daughter  SLP Follow-up?: Yes              Mignon Vaughn CCC-SLP  03/30/2017

## 2017-03-30 NOTE — ED PROVIDER NOTES
SCRIBE #1 NOTE: I, Conrad Gutiérrez, am scribing for, and in the presence of, Felice Cunningham Jr., MD. I have scribed the entire note.      History      Chief Complaint   Patient presents with    Aphasia     pt with onset HA and aphasia this morning with L sided weakness       Review of patient's allergies indicates:  Allergies not on file     HPI   HPI    3/29/2017, 9:40 PM   History obtained from the paramedic      History of Present Illness: Christine Mcgill is a 61 y.o. female patient who presents to the Emergency Department for aphasia and left-sided weakness onset gradually earlier today.  Paramedic states pt was last seen normal this morning at unknown time, and pt has c/o HA since. Paramedic states pt had been sleeping for most of the day before daughter called ambulance. Sx constant and moderate in severity. HPI limited due to patient aphasia.     Arrival mode:   AASI    PCP: Primary Doctor No       Past Medical History:  Past Medical History:   Diagnosis Date    COPD (chronic obstructive pulmonary disease)     HTN (hypertension)     Hypercholesteremia        Past Surgical History:  History reviewed. No pertinent surgical history.      Family History:  No family history on file.    Social History:  Social History     Social History Main Topics    Smoking status: Current Every Day Smoker    Smokeless tobacco: Not on file    Alcohol use No    Drug use: No    Sexual activity: Not on file       ROS   Review of Systems   Unable to perform ROS: Other (pt difficulty speaking)   Neurological: Positive for speech difficulty, weakness and headaches.       Physical Exam       Initial Vitals   BP Pulse Resp Temp SpO2   03/29/17 2140 03/29/17 2140 03/29/17 2140 03/29/17 2140 03/29/17 2140   202/62 106 18 97.8 °F (36.6 °C) 94 %       Physical Exam  Nursing Notes and Vital Signs Reviewed.  Constitutional: Patient is in no acute distress. Awake and alert. Well-developed and well-nourished.  Head: Atraumatic.  Normocephalic.  Eyes: PERRL. EOM intact. Conjunctivae are not pale. No scleral icterus.  ENT: Mucous membranes are moist. Oropharynx is clear and symmetric.    Neck: Supple. Full ROM. No lymphadenopathy.  Cardiovascular: Regular rate. Regular rhythm. No murmurs, rubs, or gallops. Distal pulses are 2+ and symmetric.  Pulmonary/Chest: No respiratory distress. Clear to auscultation bilaterally. No wheezing, rales, or rhonchi.  Abdominal: Soft and non-distended.  There is no tenderness.  No rebound, guarding, or rigidity.    Musculoskeletal: Moves all extremities. No obvious deformities. No edema. No calf tenderness.  Skin: Warm and dry.  Neurological: Patient is alert and oriented to person, place and time. Pupils ERRL and EOM normal. Cranial nerves II-XII are intact. Strength is full bilaterally; it is equal and 5/5 in bilateral upper and left lower extremities. Pt with weakness to LLE.  Light touch sense is intact. Difficulty speaking.  No acute focal neurological deficits noted.  Psychiatric: Normal affect. Good eye contact. Appropriate in content.    ED Course    Critical Care  Date/Time: 3/29/2017 11:48 PM  Performed by: AJ SANON JR  Authorized by: AJ SANON JR   Direct patient critical care time: 15 minutes  Additional history critical care time: 20 minutes  Documentation critical care time: 15 minutes  Consulting other physicians critical care time: 10 minutes  Consult with family critical care time: 10 minutes  Total critical care time (exclusive of procedural time) : 70 minutes        ED Vital Signs:  Vitals:    03/29/17 2140 03/29/17 2147 03/29/17 2150 03/29/17 2207   BP: (!) 202/62 (!) 150/98  (!) 156/98   Pulse: 106 104 107    Resp: 18 (!) 28     Temp: 97.8 °F (36.6 °C)      TempSrc: Oral      SpO2: (!) 94% 96%     Weight: 78 kg (172 lb)      Height: 5' (1.524 m)       03/29/17 2238 03/29/17 2242   BP:  (!) 175/110   Pulse: (!) 112 107   Resp: (!) 23 (!) 26   Temp:     TempSrc:     SpO2: 95% 95%    Weight:     Height:         Abnormal Lab Results:  Labs Reviewed   CBC W/ AUTO DIFFERENTIAL - Abnormal; Notable for the following:        Result Value    WBC 14.55 (*)     RBC 5.87 (*)     Hemoglobin 18.2 (*)     Hematocrit 51.1 (*)     Gran # 9.3 (*)     Mono # 1.5 (*)     All other components within normal limits   COMPREHENSIVE METABOLIC PANEL - Abnormal; Notable for the following:     CO2 22 (*)     Glucose 134 (*)     All other components within normal limits   ISTAT PROCEDURE - Abnormal; Notable for the following:     POC PH 7.338 (*)     POC PO2 76 (*)     POC HCO3 23.5 (*)     POC SATURATED O2 94 (*)     All other components within normal limits   APTT   PROTIME-INR   TROPONIN I   URINALYSIS   POCT GLUCOSE   POCT GLUCOSE MONITORING CONTINUOUS        All Lab Results:  Results for orders placed or performed during the hospital encounter of 03/29/17   CBC auto differential   Result Value Ref Range    WBC 14.55 (H) 3.90 - 12.70 K/uL    RBC 5.87 (H) 4.00 - 5.40 M/uL    Hemoglobin 18.2 (H) 12.0 - 16.0 g/dL    Hematocrit 51.1 (H) 37.0 - 48.5 %    MCV 87 82 - 98 fL    MCH 31.0 27.0 - 31.0 pg    MCHC 35.6 32.0 - 36.0 %    RDW 13.0 11.5 - 14.5 %    Platelets 205 150 - 350 K/uL    MPV 10.2 9.2 - 12.9 fL    Gran # 9.3 (H) 1.8 - 7.7 K/uL    Lymph # 3.5 1.0 - 4.8 K/uL    Mono # 1.5 (H) 0.3 - 1.0 K/uL    Eos # 0.2 0.0 - 0.5 K/uL    Baso # 0.06 0.00 - 0.20 K/uL    Gran% 64.1 38.0 - 73.0 %    Lymph% 23.8 18.0 - 48.0 %    Mono% 10.2 4.0 - 15.0 %    Eosinophil% 1.5 0.0 - 8.0 %    Basophil% 0.4 0.0 - 1.9 %    Differential Method Automated    Comprehensive metabolic panel   Result Value Ref Range    Sodium 138 136 - 145 mmol/L    Potassium 4.5 3.5 - 5.1 mmol/L    Chloride 105 95 - 110 mmol/L    CO2 22 (L) 23 - 29 mmol/L    Glucose 134 (H) 70 - 110 mg/dL    BUN, Bld 15 6 - 20 mg/dL    Creatinine 1.0 0.5 - 1.4 mg/dL    Calcium 9.4 8.7 - 10.5 mg/dL    Total Protein 7.8 6.0 - 8.4 g/dL    Albumin 4.2 3.5 - 5.2 g/dL    Total  Bilirubin 0.6 0.1 - 1.0 mg/dL    Alkaline Phosphatase 128 55 - 135 U/L    AST 22 10 - 40 U/L    ALT 18 10 - 44 U/L    Anion Gap 11 8 - 16 mmol/L    eGFR if African American >60 >60 mL/min/1.73 m^2    eGFR if non African American >60 >60 mL/min/1.73 m^2   APTT   Result Value Ref Range    aPTT 26.9 21.0 - 32.0 sec   Protime-INR   Result Value Ref Range    Prothrombin Time 10.7 9.0 - 12.5 sec    INR 1.0 0.8 - 1.2   Troponin I   Result Value Ref Range    Troponin I <0.006 0.000 - 0.026 ng/mL   POCT glucose   Result Value Ref Range    POCT Glucose 105 70 - 110 mg/dL   ISTAT PROCEDURE   Result Value Ref Range    POC PH 7.338 (L) 7.35 - 7.45    POC PCO2 43.7 35 - 45 mmHg    POC PO2 76 (L) 80 - 100 mmHg    POC HCO3 23.5 (L) 24 - 28 mmol/L    POC BE -2 -2 to 2 mmol/L    POC SATURATED O2 94 (L) 95 - 100 %    Sample ARTERIAL     Site LR     Allens Test Pass     DelSys Nasal Can     Mode SPONT     Flow 2     FiO2 28          Imaging Results:  Imaging Results         X-Ray Chest AP Portable (Final result) Result time:  03/29/17 22:15:13    Final result by Lino Fonseca III, MD (03/29/17 22:15:13)    Impression:    . Normal heart size. Clear lungs with chronic changes suggested.      Electronically signed by: LINO FONSECA MD  Date:     03/29/17  Time:    22:15     Narrative:    Chest x-ray, single view.    Clinical indication: Stroke.    Normal heart size. There is coarsening of the interstitial markings presumably chronic in nature. No consolidation. No effusion.            CT Head Without Contrast (Final result) Result time:  03/29/17 22:07:44    Final result by Lino Fonseca III, MD (03/29/17 22:07:44)    Impression:     No bleed or other acute intracranial event suggested.        I immediately called these results to the emergency room physician at the time of the interpretation as requested.    All CT scans at this facility use dose modulation, iterative reconstruction, and/or weight based dosing when  appropriate to reduce radiation dose to as low as reasonably achievable.      Electronically signed by: MARSHALL CABA MD  Date:     03/29/17  Time:    22:07     Narrative:    CT of the head without contrast.    Clinical indication: aphasia.     Standard noncontrast CT scan of the brain. No previous for comparison.    The brain and ventricles show mild changes of atrophy. The scan is slightly degraded by motion artifact. Minimal white matter changes are present. No hemorrhage, mass lesion, or hydrocephalus.   No depressed skull fracture.             The EKG was ordered, reviewed, and independently interpreted by the ED provider.  Interpretation time: 9:50 PM  Rate: 98 BPM  Rhythm: normal sinus rhythm  Interpretation: No STEMI.         The Emergency Provider reviewed the vital signs and test results, which are outlined above.    ED Discussion       10:40 PM: Telestroke consult with Dr. Culp : Pt not candidate for TPA.     11:11 PM. Discussed case with  Dr. Olsen ([facility] medicine); who agrees with current care and management of pt and accepts admission.  Admitting Service: [facility]  Admitting Physician: Dr. Olsen  Admit to: ICU       11:24 PM: Re-evaluated pt. I have discussed test results, shared treatment plan, and the need for admission with patient and family at bedside. Pt and family express understanding at this time and agree with all information. All questions answered. Pt and family have no further questions or concerns at this time. Pt is ready for admit.       ED Medication(s):  Medications   aspirin suppository 300 mg (not administered)   lorazepam injection 1 mg (1 mg Intravenous Given 3/29/17 0130)       New Prescriptions    No medications on file              Medical Decision Making              Scribe Attestation:   Scribe #1: I performed the above scribed service and the documentation accurately describes the services I performed. I attest to the accuracy of the  note.    Attending:   Physician Attestation Statement for Scribe #1: I, Felice Cunningham Jr., MD, personally performed the services described in this documentation, as scribed by Conrad Gutiérrez in my presence, and it is both accurate and complete.          Clinical Impression       ICD-10-CM ICD-9-CM   1. Expressive aphasia R47.01 784.3   2. Ischemic stroke I63.9 434.91   3. CVA (cerebral vascular accident) I63.9 434.91       Disposition:   Disposition: Admitted  Condition: Critical         Felice Cunningham Jr., MD  03/29/17 5744

## 2017-03-30 NOTE — ED NOTES
Pt resting in ER stretcher, aaox4, rr e/u, mild distress r/t anxiety noted. Pt remains on cardiac monitor with vss noted. Bed low and locked, call light in reach, side rails up x2. Pt acknowledged and family verbalized understanding of status and POC; denies further needs. Will continue to monitor.

## 2017-03-30 NOTE — PLAN OF CARE
Discussed discharge to Rehab with family this am. Multiple Rehab facilities information given to family. CM awaiting family choice.       03/30/17 1304   Discharge Assessment   Assessment Type Discharge Planning Assessment   Confirmed/corrected address and phone number on facesheet? Yes   Assessment information obtained from? Caregiver   Expected Length of Stay (days) 3   Communicated expected length of stay with patient/caregiver yes   Type of Healthcare Directive Received (none)   If Healthcare Directive is received, is it scanned into Epic? no (comment)   Prior to hospitilization cognitive status: Alert/Oriented   Prior to hospitalization functional status: Independent   Current cognitive status: (Patient crying in appropriate at time.)   Current Functional Status: Assistive Equipment;Needs Assistance;Partially Dependent   Arrived From admitted as an inpatient;home or self-care   Lives With spouse   Able to Return to Prior Arrangements yes   Is patient able to care for self after discharge? (will need rehab prior to return ing home as per MD recommendation.)   How many people do you have in your home that can help with your care after discharge? 1   Who are your caregiver(s) and their phone number(s)? (spouse-Patel-; daughter- Lzezbbcu-[phone])   Patient's perception of discharge disposition admitted as an inpatient;home or selfcare   Readmission Within The Last 30 Days no previous admission in last 30 days   Patient currently being followed by outpatient case management? No   Patient currently receives home health services? No   Does the patient currently use HME? No   Patient currently receives private duty nursing? N/A   Patient currently receives any other outside agency services? No   Equipment Currently Used at Home none   Do you have any problems affording any of your prescribed medications? No   Is the patient taking medications as prescribed? yes   Do you have any financial concerns preventing you  from receiving the healthcare you need? No   Does the patient have transportation to healthcare appointments? Yes   Transportation Available family or friend will provide;car   On Dialysis? No   Does the patient receive services at the [facility]mad[facility] Clinic? No   Are there any open cases? No   Discharge Plan A Rehab   Discharge Plan B Rehab   Patient/Family In Agreement With Plan yes

## 2017-03-30 NOTE — PROGRESS NOTES
[facility] Hospital Medicine  Progress Note    Patient Name: Christine Mcgill  MRN: [id number]  Patient Class: IP- Inpatient   Admission Date: 3/29/2017  Length of Stay: 1 days  Attending Physician: Colt Olsen MD  Primary Care Provider: Primary Doctor No        Subjective:     Principal Problem:Acute CVA (cerebrovascular accident)    HPI:  Ms. Mcgill is a 60 y/o  female with h/o HTN, hyperlipidemia, presents to the ED with aphasia. She was last known normal at 9 AM, slept most of the day, was noted by family members around 8:30 PM that patient unable to talk. In the ED, CT head was negative for hemorrhage or mass. Tele stroke was done, patient not a TPA candidate due to outside of therapeutic window. Patient is spontaneously moving al extremities, she is able to comprehend well but unable to express.    Hospital Course:  3/30/17 - Patient 60 yo female admitted for acute CVA confirmed by MRI. Patient with ongoing deficits, evaluated by Speech recommended for NPO. Neurology on consult.    Interval History: Patient s/p MRI demonstrating an acute CVA. CM evaluating for Rehab    Review of Systems   Unable to perform ROS: Acuity of condition     Objective:     Vital Signs (Most Recent):  Temp: 97.5 °F (36.4 °C) (03/30/17 0700)  Pulse: 97 (03/30/17 1328)  Resp: (!) 25 (03/30/17 1328)  BP: (!) 177/99 (03/30/17 0900)  SpO2: (!) 93 % (03/30/17 1328) Vital Signs (24h Range):  Temp:  [97.5 °F (36.4 °C)-98.3 °F (36.8 °C)] 97.5 °F (36.4 °C)  Pulse:  [] 97  Resp:  [15-31] 25  SpO2:  [93 %-98 %] 93 %  BP: (113-202)/() 177/99     Weight: 87.6 kg (193 lb 2 oz)  Body mass index is 37.72 kg/(m^2).    Intake/Output Summary (Last 24 hours) at 03/30/17 1356  Last data filed at 03/30/17 1000   Gross per 24 hour   Intake           626.25 ml   Output              305 ml   Net           321.25 ml      Physical Exam   Constitutional: She is oriented to person, place, and time. She appears well-developed and  well-nourished.   + Expressive Aphasia   HENT:   Head: Normocephalic and atraumatic.   Eyes: EOM are normal. Pupils are equal, round, and reactive to light.   Neck: Normal range of motion. Neck supple. No JVD present.   Cardiovascular: Normal heart sounds and intact distal pulses.  Exam reveals no gallop and no friction rub.    No murmur heard.  IRR IRR   Pulmonary/Chest: Effort normal and breath sounds normal. No respiratory distress. She has no wheezes.   Abdominal: Soft. Bowel sounds are normal. She exhibits no distension.   Musculoskeletal: Normal range of motion. She exhibits no edema or tenderness.   + L Sided Discoordination.    Neurological: She is alert and oriented to person, place, and time. No cranial nerve deficit. She exhibits abnormal muscle tone. Coordination abnormal.   Skin: Skin is warm and dry. She is not diaphoretic. No erythema.   Psychiatric:   Emotional Tearful   Nursing note and vitals reviewed.      Significant Labs:   BMP:   Recent Labs  Lab 03/30/17 0431         K 4.4      CO2 21*   BUN 18   CREATININE 1.0   CALCIUM 9.1     CBC:   Recent Labs  Lab 03/29/17 2145 03/30/17 0431   WBC 14.55* 15.29*   HGB 18.2* 17.1*   HCT 51.1* 47.6    210     CMP:   Recent Labs  Lab 03/29/17 2145 03/30/17 0431    141   K 4.5 4.4    106   CO2 22* 21*   * 104   BUN 15 18   CREATININE 1.0 1.0   CALCIUM 9.4 9.1   PROT 7.8 7.1   ALBUMIN 4.2 3.9   BILITOT 0.6 0.7   ALKPHOS 128 113   AST 22 19   ALT 18 19   ANIONGAP 11 14   EGFRNONAA >60 >60     Cardiac Markers: No results for input(s): CKMB, MYOGLOBIN, BNP, TROPISTAT in the last 48 hours.  Troponin:   Recent Labs  Lab 03/29/17 2145   TROPONINI <0.006     All pertinent labs within the past 24 hours have been reviewed.    Significant Imaging: MRI  Impression       Acute CVA in the medial aspect of the left thalamus and in the right anterior aspect of the britney and centrally within the britney.  Only a diffusion scan was  "able to be performed on this patient.             Assessment/Plan:      * Acute CVA (cerebrovascular accident)  - - With expressive aphasia  - Not a TPA candidate "out of treatment window"  - Aspirin rectally, Statin  - MRI Brain, MRA Head/Neck + For CVA  - Rehab consult  - Neurology consult  - NPO   - PT/OT/ST  - Allow permissivehypertension    Expressive aphasia  Due to Acute CVA  Neuro  Rehab  ST       Essential hypertension  - Allow permissive hypertension  - Intervene if SBP > 220 and DBP > 120      Tobacco abuse   on cessation      VTE Risk Mitigation         Ordered     enoxaparin injection 40 mg  Daily     Route:  Subcutaneous        03/30/17 0013     Medium Risk of VTE  Once      03/30/17 0013     Place sequential compression device  Until discontinued      03/30/17 0013     Reason for No Pharmacological VTE Prophylaxis  Once      03/30/17 0013        35 mins cc time    Alvin Joseph MD  Department of Hospital Medicine   [facility]  "

## 2017-03-30 NOTE — ED NOTES
Dr. Culp finished with telemedicine consult at this time and speaking with Dr. Cunningham. Pt and family verbalized understanding.

## 2017-03-30 NOTE — PROGRESS NOTES
0020 Patient arrived to unit from ER.  Patient has noticeable L sd weakness in  strength, arm strength and slight left sided facial droop noticed when asked to smile. Patient has some expressive aphasia but is able to correctly state she is in the hospital, not answering any other questions.  Baseline neurological assessment / Stroke scale is difficult to obtain due to ativan IV administration at 2357 before arriving to unit.  EICU notified of pt admit and is currently reviewing chart / orders.

## 2017-03-30 NOTE — CONSULTS
Inpatient consult to Pulmonology  Consult performed by: GIOVANNA MARTIN  Consult ordered by: HALI HUNTER  Reason for consult: critical care management        Critical Care Consult      Chief Complaint:  Acute CVA    HPI:    Christine Mcgill is a 61 y.o. female with a PMH of HTN, hypercholesteremia, COPD, and everyday tobacco use who presented to [facility] just before 10pm last night with speech difficulty for 12 hours, family also reported obvious left sided weakness, periods of anger and crying, and random movements of bilateral lower extremities and left lower extremity.  She was seen by tele-neurology; CT of head negative for bleed/mass; not candidate for tPA s/t onset time > 10 hours; admitted to ICU for close neuro monitoring with potential for life threatening deterioration.    PMHx:      Past Medical History:   Diagnosis Date    COPD (chronic obstructive pulmonary disease)     HTN (hypertension)     Hypercholesteremia         PMSx:      History reviewed. No pertinent surgical history.     Social Hx:      Social History     Social History    Marital status:      Spouse name: N/A    Number of children: N/A    Years of education: N/A     Occupational History    Not on file.     Social History Main Topics    Smoking status: Current Every Day Smoker    Smokeless tobacco: Not on file    Alcohol use No    Drug use: No    Sexual activity: Not on file     Other Topics Concern    Not on file     Social History Narrative    No narrative on file        Family Hx:      Family History   Problem Relation Age of Onset    Family history unknown: Yes        Allergies:      Review of patient's allergies indicates:  No Known Allergies    Medications:    Home medications prior to admission reviewed.  Current Facility-Administered Medications   Medication    0.9%  NaCl infusion    albuterol-ipratropium 2.5mg-0.5mg/3mL nebulizer solution 3 mL    arformoterol nebulizer solution 15 mcg    aspirin suppository  300 mg    budesonide nebulizer solution 0.5 mg    enoxaparin injection 40 mg    labetalol injection 10 mg    ondansetron injection 4 mg    pantoprazole injection 40 mg    pravastatin tablet 40 mg    sodium chloride 0.9% bolus 500 mL       ROS:  Unable, s/t post MRI attempt    Vital Signs (Most Recent)  Temp: 97.5 °F (36.4 °C) (03/30/17 0700)  Pulse: 100 (03/30/17 1000)  Resp: 20 (03/30/17 1000)  BP: (!) 177/99 (03/30/17 0900)  SpO2: 95 % (03/30/17 1000)    Vital Signs Range (Last 24H):  Temp:  [97.5 °F (36.4 °C)-98.3 °F (36.8 °C)]   Pulse:  []   Resp:  [15-31]   BP: (113-202)/()   SpO2:  [94 %-98 %]     I & O (Last 24H):  Intake/Output Summary (Last 24 hours) at 03/30/17 1150  Last data filed at 03/30/17 1000   Gross per 24 hour   Intake           626.25 ml   Output              305 ml   Net           321.25 ml     Ventilator Data (Last 24H):     Oxygen Concentration (%):  [28] 28       Physical Exam   Constitutional: She appears well-developed. Nasal cannula in place.   Sedated post MRI attempt   HENT:   Head: Normocephalic and atraumatic.   Eyes: Conjunctivae are normal. Pupils are equal, round, and reactive to light.   Neck: No JVD present. No tracheal deviation present.   Cardiovascular: Regular rhythm and intact distal pulses.   No extrasystoles are present. Tachycardia present.    No murmur heard.  Pulmonary/Chest: No tachypnea. No respiratory distress. She has no decreased breath sounds. She has no wheezes. She has no rales.   Abdominal: Soft. Bowel sounds are normal. She exhibits no distension. There is no tenderness.   Neurological:   Assessment limited with sedation   Skin: Skin is warm, dry and intact.       Laboratory (Last 24H):  CBC:    Recent Labs  Lab 03/30/17  0431   WBC 15.29*   HGB 17.1*   HCT 47.6        CMP:    Recent Labs  Lab 03/30/17  0431   CALCIUM 9.1   ALBUMIN 3.9   PROT 7.1      K 4.4   CO2 21*      BUN 18   CREATININE 1.0   ALKPHOS 113   ALT 19   AST  19   BILITOT 0.7       Labs within the past 24 hours have been reviewed.    Chest X-Ray:   Film and report reviewed:    ASSESSMENT/PLAN:     Problem   Acute Cva (Cerebrovascular Accident)   Essential Hypertension   Tobacco Abuse       1. Neuro: monitor; MRI insuccessful, will plan repeat head CT in am  2. Pulmonary: wean supplemental oxygen to keep sat > 92; encourage smoking cessation; bronchodilator  3. Cardiac: monitor hemodynamics; permissive HTN today with prn BB for severe hypertension  4. Renal: accurate I/O; monitor creatinine  5. Infectious Disease: sepsis surveillance  6. Hematology/Oncology: monitor for bleeding  7. Endocrine: monitor glucose trend  8. Fluids/Electrolytes/Nutrition/GI: IV hydration; ST swallow evaluation; monitor electrolytes  9. Musculoskeletal: PT/OT for mobilization, post CVA rehab  10. Pain Management:  Prn analgesia  11. Discharge and Palliative Care: anticipate inpt rehab need on discharge    Preventive Measures:   Nutrition: Goal: Tolerate oral diet and meet caloric needs  Stress Ulcer: PPI  DVT: LMWH/SCDs  BB: prn IV in place  Head of Bed/Reposition: Elevate HOB and turn Q1-2 hours    Physical Therapy: consulted  Garcia Day: 1  Counseled patient and family on benefits of smoking cessation  Pneumonia Vaccine: refused  Flu Vaccine: up to date  Surrogate Decision Maker: spouse  Code Status: full    Counseling/Consultation:I have discussed the care of this patient in detail with the nursing staff and Dr. Boswell and Dr Joseph.  Status and plan of care discussed with team on multidisciplinary rounds.    Thank you Dr. Olsen for this consult and the pleasure of evaluating and caring for this patient; anticipate transfer out of ICU in am if no decline, will follow while in ICU and sign off on transfer out.    Guadalupe Cope [facility]  [facility]

## 2017-03-30 NOTE — H&P
[facility] Hospital Medicine  History & Physical    Patient Name: Christine Mcgill  MRN: [id number]  Admission Date: 3/29/2017  Attending Physician: Colt Olsen MD  Primary Care Provider: Primary Doctor No         Patient information was obtained from relative(s) and ER records.     Subjective:     Principal Problem:Acute CVA (cerebrovascular accident)    Chief Complaint:   Chief Complaint   Patient presents with    Aphasia     pt with onset HA and aphasia this morning with L sided weakness        HPI: Ms. Mcgill is a 60 y/o  female with h/o HTN, hyperlipidemia, presents to the ED with aphasia. She was last known normal at 9 AM, slept most of the day, was noted by family members around 8:30 PM that patient unable to talk. In the ED, CT head was negative for hemorrhage or mass. Tele stroke was done, patient not a TPA candidate due to outside of therapeutic window. Patient is spontaneously moving al extremities, she is able to comprehend well but unable to express.    Past Medical History:   Diagnosis Date    COPD (chronic obstructive pulmonary disease)     HTN (hypertension)     Hypercholesteremia        History reviewed. No pertinent surgical history.    Review of patient's allergies indicates:  No Known Allergies    No current facility-administered medications on file prior to encounter.      No current outpatient prescriptions on file prior to encounter.     Family History     Reviewed and Not Pertinent        Social History Main Topics    Smoking status: Current Every Day Smoker    Smokeless tobacco: Not on file    Alcohol use No    Drug use: No    Sexual activity: Not on file     Review of Systems   Unable to perform ROS: Mental status change     Objective:     Vital Signs (Most Recent):  Temp: 97.8 °F (36.6 °C) (03/29/17 2140)  Pulse: (!) 113 (03/29/17 2322)  Resp: (!) 25 (03/29/17 2322)  BP: (!) 145/114 (03/29/17 2322)  SpO2: (!) 94 % (03/29/17 2322) Vital Signs (24h  Range):  Temp:  [97.8 °F (36.6 °C)] 97.8 °F (36.6 °C)  Pulse:  [104-113] 113  Resp:  [18-28] 25  SpO2:  [94 %-96 %] 94 %  BP: (145-202)/() 145/114     Weight: 78 kg (172 lb)  Body mass index is 33.59 kg/(m^2).    Physical Exam   Constitutional: She appears well-developed and well-nourished. No distress.   HENT:   Head: Normocephalic and atraumatic.   Eyes: Conjunctivae are normal. No scleral icterus.   Neck: Normal range of motion. Neck supple. No JVD present. No tracheal deviation present. No thyromegaly present.   Cardiovascular: Normal rate, regular rhythm, normal heart sounds and intact distal pulses.    No murmur heard.  Pulmonary/Chest: Effort normal and breath sounds normal. No respiratory distress. She has no wheezes. She has no rales. She exhibits no tenderness.   Abdominal: Soft. She exhibits no distension. There is no tenderness.   Musculoskeletal: Normal range of motion. She exhibits no edema or tenderness.   Lymphadenopathy:     She has no cervical adenopathy.   Neurological: She is alert. No cranial nerve deficit. She exhibits normal muscle tone. Coordination normal.   Expressive aphasia   Skin: Skin is warm and dry. She is not diaphoretic. No erythema.   Nursing note and vitals reviewed.       Significant Labs:   BMP:   Recent Labs  Lab 03/29/17  2145   *      K 4.5      CO2 22*   BUN 15   CREATININE 1.0   CALCIUM 9.4     CBC:   Recent Labs  Lab 03/29/17  2145   WBC 14.55*   HGB 18.2*   HCT 51.1*        CMP:   Recent Labs  Lab 03/29/17  2145      K 4.5      CO2 22*   *   BUN 15   CREATININE 1.0   CALCIUM 9.4   PROT 7.8   ALBUMIN 4.2   BILITOT 0.6   ALKPHOS 128   AST 22   ALT 18   ANIONGAP 11   EGFRNONAA >60     All pertinent labs within the past 24 hours have been reviewed.    Significant Imaging:   Imaging Results         X-Ray Chest AP Portable (Final result) Result time:  03/29/17 22:15:13    Final result by Lino Fonseca III, MD (03/29/17  "22:15:13)    Impression:    . Normal heart size. Clear lungs with chronic changes suggested.      Electronically signed by: LINO CABA MD  Date:     03/29/17  Time:    22:15     Narrative:    Chest x-ray, single view.    Clinical indication: Stroke.    Normal heart size. There is coarsening of the interstitial markings presumably chronic in nature. No consolidation. No effusion.            CT Head Without Contrast (Final result) Result time:  03/29/17 22:07:44    Final result by Lino Caba III, MD (03/29/17 22:07:44)    Impression:     No bleed or other acute intracranial event suggested.        I immediately called these results to the emergency room physician at the time of the interpretation as requested.    All CT scans at this facility use dose modulation, iterative reconstruction, and/or weight based dosing when appropriate to reduce radiation dose to as low as reasonably achievable.      Electronically signed by: LINO CABA MD  Date:     03/29/17  Time:    22:07     Narrative:    CT of the head without contrast.    Clinical indication: aphasia.     Standard noncontrast CT scan of the brain. No previous for comparison.    The brain and ventricles show mild changes of atrophy. The scan is slightly degraded by motion artifact. Minimal white matter changes are present. No hemorrhage, mass lesion, or hydrocephalus.   No depressed skull fracture.                Assessment/Plan:     * Acute CVA (cerebrovascular accident)  - - With expressive aphasia  - Not a TPA candidate "out of treatment window"  - Aspirin rectally, Statin  - MRI Brain, MRA Head/Neck  - Rehab consult  - Neurology consult  - NPO until ST evaluation  - PT/OT/ST  - Allow permissivehypertension    Expressive aphasia         Essential hypertension  - Allow permissive hypertension  - Intervene if SBP > 220 and DBP > 120      VTE Risk Mitigation     SCDs        Colt Olsen MD  Department of Hospital Medicine   [facility]

## 2017-03-30 NOTE — ASSESSMENT & PLAN NOTE
"- - With expressive aphasia  - Not a TPA candidate "out of treatment window"  - Aspirin rectally, Statin  - MRI Brain, MRA Head/Neck  - Rehab consult  - Neurology consult  - NPO until ST evaluation  - PT/OT/ST  - Allow permissivehypertension  "

## 2017-03-31 ENCOUNTER — ANESTHESIA EVENT (OUTPATIENT)
Dept: SURGERY | Facility: HOSPITAL | Age: 62
DRG: 065 | End: 2017-03-31
Payer: MEDICAID

## 2017-03-31 LAB
ALBUMIN SERPL BCP-MCNC: 3.4 G/DL
ALP SERPL-CCNC: 95 U/L
ALT SERPL W/O P-5'-P-CCNC: 16 U/L
ANION GAP SERPL CALC-SCNC: 10 MMOL/L
AST SERPL-CCNC: 20 U/L
BASOPHILS # BLD AUTO: 0.06 K/UL
BASOPHILS NFR BLD: 0.5 %
BILIRUB SERPL-MCNC: 0.8 MG/DL
BUN SERPL-MCNC: 24 MG/DL
CALCIUM SERPL-MCNC: 8.7 MG/DL
CHLORIDE SERPL-SCNC: 110 MMOL/L
CO2 SERPL-SCNC: 22 MMOL/L
CREAT SERPL-MCNC: 0.9 MG/DL
DIFFERENTIAL METHOD: ABNORMAL
EOSINOPHIL # BLD AUTO: 0.1 K/UL
EOSINOPHIL NFR BLD: 1.1 %
ERYTHROCYTE [DISTWIDTH] IN BLOOD BY AUTOMATED COUNT: 13.5 %
EST. GFR  (AFRICAN AMERICAN): >60 ML/MIN/1.73 M^2
EST. GFR  (NON AFRICAN AMERICAN): >60 ML/MIN/1.73 M^2
ESTIMATED AVG GLUCOSE: 111 MG/DL
GLUCOSE SERPL-MCNC: 78 MG/DL
HBA1C MFR BLD HPLC: 5.5 %
HCT VFR BLD AUTO: 44.1 %
HGB BLD-MCNC: 15.2 G/DL
LYMPHOCYTES # BLD AUTO: 3.4 K/UL
LYMPHOCYTES NFR BLD: 30.1 %
MCH RBC QN AUTO: 31.1 PG
MCHC RBC AUTO-ENTMCNC: 34.5 %
MCV RBC AUTO: 90 FL
MONOCYTES # BLD AUTO: 1.5 K/UL
MONOCYTES NFR BLD: 12.8 %
NEUTROPHILS # BLD AUTO: 6.3 K/UL
NEUTROPHILS NFR BLD: 55.5 %
PLATELET # BLD AUTO: 182 K/UL
PMV BLD AUTO: 10.4 FL
POTASSIUM SERPL-SCNC: 4.1 MMOL/L
PROT SERPL-MCNC: 6 G/DL
RBC # BLD AUTO: 4.89 M/UL
SODIUM SERPL-SCNC: 142 MMOL/L
WBC # BLD AUTO: 11.41 K/UL

## 2017-03-31 PROCEDURE — 85025 COMPLETE CBC W/AUTO DIFF WBC: CPT

## 2017-03-31 PROCEDURE — 25000242 PHARM REV CODE 250 ALT 637 W/ HCPCS: Performed by: INTERNAL MEDICINE

## 2017-03-31 PROCEDURE — 63600175 PHARM REV CODE 636 W HCPCS: Performed by: EMERGENCY MEDICINE

## 2017-03-31 PROCEDURE — 92526 ORAL FUNCTION THERAPY: CPT

## 2017-03-31 PROCEDURE — C9113 INJ PANTOPRAZOLE SODIUM, VIA: HCPCS | Performed by: EMERGENCY MEDICINE

## 2017-03-31 PROCEDURE — 63600175 PHARM REV CODE 636 W HCPCS: Performed by: SURGERY

## 2017-03-31 PROCEDURE — 92611 MOTION FLUOROSCOPY/SWALLOW: CPT

## 2017-03-31 PROCEDURE — 97530 THERAPEUTIC ACTIVITIES: CPT

## 2017-03-31 PROCEDURE — 27000221 HC OXYGEN, UP TO 24 HOURS

## 2017-03-31 PROCEDURE — 94640 AIRWAY INHALATION TREATMENT: CPT

## 2017-03-31 PROCEDURE — 80053 COMPREHEN METABOLIC PANEL: CPT

## 2017-03-31 PROCEDURE — 63600175 PHARM REV CODE 636 W HCPCS: Performed by: INTERNAL MEDICINE

## 2017-03-31 PROCEDURE — 92507 TX SP LANG VOICE COMM INDIV: CPT

## 2017-03-31 PROCEDURE — 25000003 PHARM REV CODE 250: Performed by: EMERGENCY MEDICINE

## 2017-03-31 PROCEDURE — 99291 CRITICAL CARE FIRST HOUR: CPT | Mod: ,,, | Performed by: PSYCHIATRY & NEUROLOGY

## 2017-03-31 PROCEDURE — 25000003 PHARM REV CODE 250: Performed by: INTERNAL MEDICINE

## 2017-03-31 PROCEDURE — 63600175 PHARM REV CODE 636 W HCPCS: Performed by: NURSE PRACTITIONER

## 2017-03-31 PROCEDURE — 20000000 HC ICU ROOM

## 2017-03-31 PROCEDURE — 25000003 PHARM REV CODE 250: Performed by: NURSE PRACTITIONER

## 2017-03-31 PROCEDURE — 99232 SBSQ HOSP IP/OBS MODERATE 35: CPT | Mod: ,,, | Performed by: SURGERY

## 2017-03-31 PROCEDURE — 63600175 PHARM REV CODE 636 W HCPCS: Performed by: PSYCHIATRY & NEUROLOGY

## 2017-03-31 PROCEDURE — 36415 COLL VENOUS BLD VENIPUNCTURE: CPT

## 2017-03-31 RX ORDER — MORPHINE SULFATE 2 MG/ML
2 INJECTION, SOLUTION INTRAMUSCULAR; INTRAVENOUS EVERY 4 HOURS PRN
Status: DISCONTINUED | OUTPATIENT
Start: 2017-03-31 | End: 2017-04-02

## 2017-03-31 RX ORDER — LORAZEPAM 2 MG/ML
0.5 INJECTION INTRAMUSCULAR EVERY 8 HOURS PRN
Status: DISCONTINUED | OUTPATIENT
Start: 2017-03-31 | End: 2017-04-05 | Stop reason: HOSPADM

## 2017-03-31 RX ORDER — MORPHINE SULFATE 2 MG/ML
1 INJECTION, SOLUTION INTRAMUSCULAR; INTRAVENOUS ONCE
Status: COMPLETED | OUTPATIENT
Start: 2017-03-31 | End: 2017-03-31

## 2017-03-31 RX ORDER — MORPHINE SULFATE 4 MG/ML
4 INJECTION, SOLUTION INTRAMUSCULAR; INTRAVENOUS EVERY 4 HOURS PRN
Status: DISCONTINUED | OUTPATIENT
Start: 2017-03-31 | End: 2017-04-02

## 2017-03-31 RX ORDER — NYSTATIN 100000 [USP'U]/ML
500000 SUSPENSION ORAL
Status: DISCONTINUED | OUTPATIENT
Start: 2017-03-31 | End: 2017-03-31

## 2017-03-31 RX ORDER — FLUCONAZOLE 2 MG/ML
200 INJECTION, SOLUTION INTRAVENOUS ONCE
Status: DISCONTINUED | OUTPATIENT
Start: 2017-03-31 | End: 2017-03-31

## 2017-03-31 RX ORDER — MORPHINE SULFATE 2 MG/ML
1 INJECTION, SOLUTION INTRAMUSCULAR; INTRAVENOUS ONCE
Status: DISCONTINUED | OUTPATIENT
Start: 2017-03-31 | End: 2017-04-02

## 2017-03-31 RX ORDER — FLUCONAZOLE 2 MG/ML
100 INJECTION, SOLUTION INTRAVENOUS
Status: DISCONTINUED | OUTPATIENT
Start: 2017-04-01 | End: 2017-03-31

## 2017-03-31 RX ADMIN — IPRATROPIUM BROMIDE AND ALBUTEROL SULFATE 3 ML: .5; 3 SOLUTION RESPIRATORY (INHALATION) at 03:03

## 2017-03-31 RX ADMIN — LORAZEPAM 0.5 MG: 2 INJECTION, SOLUTION INTRAMUSCULAR; INTRAVENOUS at 07:03

## 2017-03-31 RX ADMIN — ARFORMOTEROL TARTRATE 15 MCG: 15 SOLUTION RESPIRATORY (INHALATION) at 07:03

## 2017-03-31 RX ADMIN — IPRATROPIUM BROMIDE AND ALBUTEROL SULFATE 3 ML: .5; 3 SOLUTION RESPIRATORY (INHALATION) at 07:03

## 2017-03-31 RX ADMIN — BUDESONIDE 0.5 MG: 0.5 SUSPENSION RESPIRATORY (INHALATION) at 07:03

## 2017-03-31 RX ADMIN — ENOXAPARIN SODIUM 40 MG: 100 INJECTION SUBCUTANEOUS at 06:03

## 2017-03-31 RX ADMIN — IPRATROPIUM BROMIDE AND ALBUTEROL SULFATE 3 ML: .5; 3 SOLUTION RESPIRATORY (INHALATION) at 04:03

## 2017-03-31 RX ADMIN — MORPHINE SULFATE 1 MG: 2 INJECTION, SOLUTION INTRAMUSCULAR; INTRAVENOUS at 06:03

## 2017-03-31 RX ADMIN — SODIUM CHLORIDE: 0.9 INJECTION, SOLUTION INTRAVENOUS at 05:03

## 2017-03-31 RX ADMIN — IPRATROPIUM BROMIDE AND ALBUTEROL SULFATE 3 ML: .5; 3 SOLUTION RESPIRATORY (INHALATION) at 12:03

## 2017-03-31 RX ADMIN — ARFORMOTEROL TARTRATE 15 MCG: 15 SOLUTION RESPIRATORY (INHALATION) at 08:03

## 2017-03-31 RX ADMIN — ASPIRIN 300 MG: 300 SUPPOSITORY RECTAL at 10:03

## 2017-03-31 RX ADMIN — MORPHINE SULFATE 2 MG: 2 INJECTION, SOLUTION INTRAMUSCULAR; INTRAVENOUS at 08:03

## 2017-03-31 RX ADMIN — PANTOPRAZOLE SODIUM 40 MG: 40 INJECTION, POWDER, FOR SOLUTION INTRAVENOUS at 08:03

## 2017-03-31 NOTE — PLAN OF CARE
Problem: Patient Care Overview  Goal: Plan of Care Review  PT REQUIRES MAX A X 2 FOR BED MOBILITY AND T/F TO CHAIR.   Outcome: Ongoing (interventions implemented as appropriate)  PT REQUIRES MAX A X 2 FOR T/F TO CHAIR.

## 2017-03-31 NOTE — SUBJECTIVE & OBJECTIVE
Interval History: Swallow study demonstrating aspiration - NPO / Peg placement in progress    Review of Systems   Unable to perform ROS: Acuity of condition     Objective:     Vital Signs (Most Recent):  Temp: 98 °F (36.7 °C) (03/31/17 1500)  Pulse: 85 (03/31/17 1534)  Resp: (!) 22 (03/31/17 1534)  BP: (!) 173/91 (03/31/17 1500)  SpO2: 95 % (03/31/17 1534) Vital Signs (24h Range):  Temp:  [97.8 °F (36.6 °C)-98 °F (36.7 °C)] 98 °F (36.7 °C)  Pulse:  [] 85  Resp:  [17-34] 22  SpO2:  [92 %-100 %] 95 %  BP: (138-208)/() 173/91     Weight: 86.8 kg (191 lb 5.8 oz)  Body mass index is 37.37 kg/(m^2).    Intake/Output Summary (Last 24 hours) at 03/31/17 1555  Last data filed at 03/31/17 1500   Gross per 24 hour   Intake           348.75 ml   Output              565 ml   Net          -216.25 ml      Physical Exam   Constitutional: She is oriented to person, place, and time. She appears well-developed and well-nourished.   + Expressive Aphasia   HENT:   Head: Normocephalic and atraumatic.   Eyes: EOM are normal. Pupils are equal, round, and reactive to light.   Neck: Normal range of motion. Neck supple. No JVD present.   Cardiovascular: Normal heart sounds and intact distal pulses.  Exam reveals no gallop and no friction rub.    No murmur heard.  IRR IRR   Pulmonary/Chest: Effort normal and breath sounds normal. No respiratory distress. She has no wheezes.   Abdominal: Soft. Bowel sounds are normal. She exhibits no distension.   Musculoskeletal: Normal range of motion. She exhibits no edema or tenderness.   + L Sided Discoordination.    Neurological: She is alert and oriented to person, place, and time. No cranial nerve deficit. She exhibits abnormal muscle tone. Coordination abnormal.   Skin: Skin is warm and dry. She is not diaphoretic. No erythema.   Psychiatric:   Emotional Tearful   Nursing note and vitals reviewed.      Significant Labs:   BMP:   Recent Labs  Lab 03/31/17  0326   GLU 78      K 4.1   CL  110   CO2 22*   BUN 24*   CREATININE 0.9   CALCIUM 8.7     CBC:   Recent Labs  Lab 03/29/17 2145 03/30/17  0431 03/31/17  0326   WBC 14.55* 15.29* 11.41   HGB 18.2* 17.1* 15.2   HCT 51.1* 47.6 44.1    210 182     CMP:   Recent Labs  Lab 03/29/17 2145 03/30/17 0431 03/31/17  0326    141 142   K 4.5 4.4 4.1    106 110   CO2 22* 21* 22*   * 104 78   BUN 15 18 24*   CREATININE 1.0 1.0 0.9   CALCIUM 9.4 9.1 8.7   PROT 7.8 7.1 6.0   ALBUMIN 4.2 3.9 3.4*   BILITOT 0.6 0.7 0.8   ALKPHOS 128 113 95   AST 22 19 20   ALT 18 19 16   ANIONGAP 11 14 10   EGFRNONAA >60 >60 >60     Troponin:   Recent Labs  Lab 03/29/17 2145   TROPONINI <0.006     All pertinent labs within the past 24 hours have been reviewed.    Significant Imaging: I have reviewed all pertinent imaging results/findings within the past 24 hours.

## 2017-03-31 NOTE — CONSULTS
[facility]   General Surgery  Consult Note    Patient Name: Christine Mcgill  MRN: [id number]  Code Status: Full Code  Admission Date: 3/29/2017  Hospital Length of Stay: 2 days  Attending Physician: Colt Olsen MD  Primary Care Provider: Primary Doctor No    Patient information was obtained from relative(s).     Consults  Subjective:     Principal Problem: Acute CVA (cerebrovascular accident)    History of Present Illness: 60 Y/o female stroke patient with expressive aphasia and dysphagia, consulted for PEG.    No current facility-administered medications on file prior to encounter.      No current outpatient prescriptions on file prior to encounter.       Review of patient's allergies indicates:  No Known Allergies    Past Medical History:   Diagnosis Date    COPD (chronic obstructive pulmonary disease)     HTN (hypertension)     Hypercholesteremia      History reviewed. No pertinent surgical history.  Family History     Family history is unknown by patient.        Social History Main Topics    Smoking status: Current Every Day Smoker    Smokeless tobacco: Not on file    Alcohol use No    Drug use: No    Sexual activity: Not on file     Review of Systems   Reason unable to perform ROS: aphasia.     Objective:     Vital Signs (Most Recent):  Temp: 97.8 °F (36.6 °C) (03/31/17 1100)  Pulse: 92 (03/31/17 1237)  Resp: 19 (03/31/17 1237)  BP: (!) 181/79 (03/31/17 1200)  SpO2: 100 % (03/31/17 1237) Vital Signs (24h Range):  Temp:  [97.8 °F (36.6 °C)-98.1 °F (36.7 °C)] 97.8 °F (36.6 °C)  Pulse:  [] 92  Resp:  [17-34] 19  SpO2:  [92 %-100 %] 100 %  BP: (138-208)/() 181/79     Weight: 86.8 kg (191 lb 5.8 oz)  Body mass index is 37.37 kg/(m^2).    Physical Exam   Constitutional: She appears distressed.   Pulmonary/Chest: Effort normal. No respiratory distress.   Musculoskeletal: She exhibits no edema.   Neurological: She is alert.   Skin: No rash noted. No erythema. No pallor.   Psychiatric:  Noncommunicative: due to aphasia.       Assessment/Plan:     * Acute CVA (cerebrovascular accident)  Plan for PEG on Monday.   Hold aspirin (received today).  The risks of PEG placement including bleeding, infection were explained to the patient and her family and they expressed understanding.  The nature of the patient's condition, probability of success, risks of refusing treatment, and alternatives and risks of the alternatives were also explained.     VTE Risk Mitigation         Ordered     enoxaparin injection 40 mg  Daily     Route:  Subcutaneous        03/30/17 0013     Medium Risk of VTE  Once      03/30/17 0013     Place sequential compression device  Until discontinued      03/30/17 0013     Reason for No Pharmacological VTE Prophylaxis  Once      03/30/17 0013          Thank you for your consult. I will follow-up with patient. Please contact us if you have any additional questions.    Louis O. Jeansonne, MD  General Surgery  [facility]

## 2017-03-31 NOTE — CONSULTS
[facility] -   Adult Nutrition  Consult Note    SUMMARY     Recommendations  Recommendation/Intervention: 1. Consider tubefeed for nutrition suppoer until patient is able to tolerate oral diet, Isosource 1.5 at 45ml/hr (1620 calories, 73g protein, 840ml water) Flushes 200ml every 6 hours.   Goals: Tolerance of nutrition support at goal rate  Nutrition Goal Status: new  Communication of RD Recs: discussed on rounds    Nutrition Discharge Plan  Cardiac diet with consistency per SLP once medically able.  Enteral Nutrition until able to tolerate oral diet (Isosource 1.5 at 45ml/hr with flushes 200ml every 6 hours)    Reason for Assessment  Reason for Assessment: physician consult (stroke order set: assess dietary needs)  Diagnosis:  (CVA, Aphasia)  Relevent Medical History: COPD, HTN, Hypercholesterolemia   Interdisciplinary Rounds: attended  General Information Comments: Patient failed swallow evaluation, poer neurology patient should recover therefore discussion in rounds was centered around Keofeed placement rather than PEG at this time.    Nutrition Prescription Ordered  Current Diet Order: NPO     Nutrition Risk Screen  Nutrition Risk Screen: dysphagia or difficulty swallowing    Nutrition/Diet History  Typical Food/Fluid Intake: unable to obtain    Labs/Tests/Procedures/Meds  Pertinent Labs Reviewed: reviewed  Pertinent Labs Comments: Alb 3.4  Pertinent Medications Reviewed: reviewed    Physical Findings  Overall Physical Appearance: obese  Oral/Mouth Cavity: WDL  Skin:  (Cory 14)    Anthropometrics  Height (inches): 60 in  Weight Method: Bed Scale  Weight (kg): 86.8 kg  Ideal Body Weight (IBW), Female: 100 lb  % Ideal Body Weight, Female (lb): 191.36 lb  BMI (kg/m2): 37.37  BMI Grade: 35 - 39.9 - obesity - grade II    Estimated/Assessed Needs  Weight Used For Calorie Calculations: 86.8 kg (191 lb 5.8 oz)   Height (cm): 152.4 cm  Energy Need Method: [place]-St Jeor (x1-1.2= 7200-1305)  Weight Used  For Protein Calculations: 86.8 kg (191 lb 5.8 oz)  0.8 gm Protein (gm): 69.59 and 1.0 gm Protein (gm): 86.98  Fluid Need Method: RDA Method (1ml/[place] or as needed)    Monitor and Evaluation  Food and Nutrient Intake: energy intake, enteral nutrition intake  Food and Nutrient Adminstration: enteral and parenteral nutrition administration, diet order  Anthropometric Measurements: weight  Biochemical Data, Medical Tests and Procedures: electrolyte and renal panel, glucose/endocrine profile  Nutrition-Focused Physical Findings: overall appearance    Nutrition Follow-Up  RD Follow-up?: Yes (2x weekly)    Assessment and Plan  * Acute CVA (cerebrovascular accident)  Nutrition Problem:   Inadequate energy intake    Etiology/Related to:   Swallowing difficulty    As evidenced by:  Inability to safely consume oral diet with no nutrition support    Treatment Strategy:   1. Enteral nutrition until oral diet is achieved (Isosource 1.5 at 45ml/hr with 200ml flush every 6 hours)    Nutrition Diagnosis Status:   New

## 2017-03-31 NOTE — PT/OT/SLP PROGRESS
Physical Therapy  Treatment    Christine Mcgill   MRN: [id number]   Admitting Diagnosis: Acute CVA (cerebrovascular accident)    PT Received On: 17  PT Start Time: 0956     PT Stop Time: 1020    PT Total Time (min): 24 min       Billable Minutes:  Therapeutic Activity 24    Treatment Type: Treatment  PT/PTA: PT             General Precautions: Standard, fall, aspiration  Orthopedic Precautions:     Braces:           Subjective:  Communicated with nurse RANDHAWA AND EPIC CHART REVIEW  prior to session.  PT AGREED TO TX. PT FAMILY PRESENT. P.T. REQUESTED FAMILY EXIT FOR FOR PT TO ATTEND TO THERAPIST TASK TO DEC DISTRACTIONS.     Pain Ratin/10              Pain Rating Post-Intervention: 0/10    Objective:   Patient found with: peripheral IV, alvarez catheter, telemetry    Functional Mobility:  Bed Mobility:   Scooting/Bridging: Total Assistance  Sit to Supine: Maximum Assistance, With assist of  2    Transfers:  Sit <> Stand Assistance: Maximum Assistance (X2)  Sit <> Stand Assistive Device: Rolling Walker  Bed <> Chair Technique: Stand Pivot  Bed <> Chair Assistance: Maximum Assistance (X2)    Gait:   Gait Distance: UNABLE    Balance:   Static Sit: POOR: Needs MODERATE assist to maintain  Dynamic Sit: 0: N/A    Therapeutic Activities and Exercises:  PT STOOD X 3 TRIALS WITH MAX A X 2 AND CUES TO QUAD AND GLUTS TO FACILITATE EXTENSION. PT WITH LEFT SIDE NEGLECT AND MULT EMOTIONAL OUTBURST DURING THERAPY. PT T/F TO BED WITH MAX A X 2 AND SEATED EOB WITH L UE  WB FACILITATION  WITH REACHING TASK OF R UE FOR ANT LEFT WT SHIFT. PT SUP IN BED WITH MAX A X 2 AND DEP SCOOTED TO HOB. P.T. FACILITATED L LE ROM TE AROM TO AAROM X 10 REPS . PT LEFT SUP IN BED WITH ALL NEEDS MET.     AM-PAC 6 CLICK MOBILITY  How much help from another person does this patient currently need?   1 = Unable, Total/Dependent Assistance  2 = A lot, Maximum/Moderate Assistance  3 = A little, Minimum/Contact Guard/Supervision  4 = None, Modified  [place]/Independent         AM-PAC Raw Score CMS G-Code Modifier Level of Impairment Assistance   6 % Total / Unable   7 - 9 CM 80 - 100% Maximal Assist   10 - 14 CL 60 - 80% Moderate Assist   15 - 19 CK 40 - 60% Moderate Assist   20 - 22 CJ 20 - 40% Minimal Assist   23 CI 1-20% SBA / CGA   24 CH 0% Independent/ Mod I     Patient left supine with call button in reach and bed alarm on.    Assessment:  PT ZEB TX WITH INC FATIGUE AS PT HAS BEEN SITTING IN CHAIR FOR >1 HOUR. PT CONT TO HAVE LEFT NEGLECT AND IS EMOTIONALLY LABILE DURING TX SESSION. PT PROGRESSING WITH STATIC CHALLENGED SITTING BALANCE TODAY    Rehab identified problem list/impairments: Rehab identified problem list/impairments: weakness, impaired endurance, impaired self care skills, impaired functional mobilty, gait instability, impaired balance, decreased safety awareness, decreased lower extremity function, decreased upper extremity function, decreased coordination, decreased ROM, impaired coordination, impaired fine motor    Rehab potential is good.    Activity tolerance: Fair    Discharge recommendations: Discharge Facility/Level Of Care Needs: rehabilitation facility     Barriers to discharge: Barriers to Discharge: Decreased caregiver support, Inaccessible home environment    Equipment recommendations:       GOALS:   Physical Therapy Goals        Problem: Physical Therapy Goal    Goal Priority Disciplines Outcome Goal Variances Interventions   Physical Therapy Goal     PT/OT, PT      Description:  PT WILL BE SEEN FOR P.T. FOR A MIN OF 5 OUT OF 7 DAYS A WEEK  LT17  1. PT WILL COMPLETE BED MOBILITY WITH MOD A.   2. PT WILL STAND WITH RW AND MOD A FOR T/F AND PRE-GT  3. PT WILL COMPLETE B LE TE X 10 REPS FOR ROM AND STRENGTHENING.                PLAN:    Patient to be seen    to address the above listed problems via gait training, therapeutic activities, therapeutic exercises, neuromuscular re-education, wheelchair  management/training  Plan of Care expires: 04/06/17  Plan of Care reviewed with: patient         Mariana Dumont, PT  03/31/2017

## 2017-03-31 NOTE — PROGRESS NOTES
[facility] Hospital Medicine  Progress Note    Patient Name: Christine Mcgill  MRN: [id number]  Patient Class: IP- Inpatient   Admission Date: 3/29/2017  Length of Stay: 2 days  Attending Physician: Colt Olsen MD  Primary Care Provider: Primary Doctor No        Subjective:     Principal Problem:Acute CVA (cerebrovascular accident)    HPI:  Ms. Mcgill is a 62 y/o  female with h/o HTN, hyperlipidemia, presents to the ED with aphasia. She was last known normal at 9 AM, slept most of the day, was noted by family members around 8:30 PM that patient unable to talk. In the ED, CT head was negative for hemorrhage or mass. Tele stroke was done, patient not a TPA candidate due to outside of therapeutic window. Patient is spontaneously moving al extremities, she is able to comprehend well but unable to express.    Hospital Course:  3/30/17 - Patient 60 yo female admitted for acute CVA confirmed by MRI. Patient with ongoing deficits, evaluated by Speech recommended for NPO. Neurology on consult.  3/31/17 - Patient Swallow study confirming aspiration - NPO recommended. Case discussed with Patient and family - PEG tube recommended. Surgery Advised the patient / Family of the risks and benefits of PEG placement. All acknowledged and accepted plan for placement . Patient having received ASA so the procedure is scheduled for Monday 4/3. Patient further plan for rehab on acceptance. Improving Expression and Movement.      Interval History: Swallow study demonstrating aspiration - NPO / Peg placement in progress    Review of Systems   Unable to perform ROS: Acuity of condition     Objective:     Vital Signs (Most Recent):  Temp: 98 °F (36.7 °C) (03/31/17 1500)  Pulse: 85 (03/31/17 1534)  Resp: (!) 22 (03/31/17 1534)  BP: (!) 173/91 (03/31/17 1500)  SpO2: 95 % (03/31/17 1534) Vital Signs (24h Range):  Temp:  [97.8 °F (36.6 °C)-98 °F (36.7 °C)] 98 °F (36.7 °C)  Pulse:  [] 85  Resp:  [17-34] 22  SpO2:  [92  %-100 %] 95 %  BP: (138-208)/() 173/91     Weight: 86.8 kg (191 lb 5.8 oz)  Body mass index is 37.37 kg/(m^2).    Intake/Output Summary (Last 24 hours) at 03/31/17 1555  Last data filed at 03/31/17 1500   Gross per 24 hour   Intake           348.75 ml   Output              565 ml   Net          -216.25 ml      Physical Exam   Constitutional: She is oriented to person, place, and time. She appears well-developed and well-nourished.   + Expressive Aphasia   HENT:   Head: Normocephalic and atraumatic.   Eyes: EOM are normal. Pupils are equal, round, and reactive to light.   Neck: Normal range of motion. Neck supple. No JVD present.   Cardiovascular: Normal heart sounds and intact distal pulses.  Exam reveals no gallop and no friction rub.    No murmur heard.  IRR IRR   Pulmonary/Chest: Effort normal and breath sounds normal. No respiratory distress. She has no wheezes.   Abdominal: Soft. Bowel sounds are normal. She exhibits no distension.   Musculoskeletal: Normal range of motion. She exhibits no edema or tenderness.   + L Sided Discoordination.    Neurological: She is alert and oriented to person, place, and time. No cranial nerve deficit. She exhibits abnormal muscle tone. Coordination abnormal.   Skin: Skin is warm and dry. She is not diaphoretic. No erythema.   Psychiatric:   Emotional Tearful   Nursing note and vitals reviewed.      Significant Labs:   BMP:   Recent Labs  Lab 03/31/17 0326   GLU 78      K 4.1      CO2 22*   BUN 24*   CREATININE 0.9   CALCIUM 8.7     CBC:   Recent Labs  Lab 03/29/17 2145 03/30/17  0431 03/31/17  0326   WBC 14.55* 15.29* 11.41   HGB 18.2* 17.1* 15.2   HCT 51.1* 47.6 44.1    210 182     CMP:   Recent Labs  Lab 03/29/17 2145 03/30/17  0431 03/31/17  0326    141 142   K 4.5 4.4 4.1    106 110   CO2 22* 21* 22*   * 104 78   BUN 15 18 24*   CREATININE 1.0 1.0 0.9   CALCIUM 9.4 9.1 8.7   PROT 7.8 7.1 6.0   ALBUMIN 4.2 3.9 3.4*   BILITOT 0.6  0.7 0.8   ALKPHOS 128 113 95   AST 22 19 20   ALT 18 19 16   ANIONGAP 11 14 10   EGFRNONAA >60 >60 >60     Troponin:   Recent Labs  Lab 03/29/17  2145   TROPONINI <0.006     All pertinent labs within the past 24 hours have been reviewed.    Significant Imaging: I have reviewed all pertinent imaging results/findings within the past 24 hours.    Assessment/Plan:      * Acute CVA (cerebrovascular accident)  - - With expressive aphasia  - Hold Aspirin pending PEG placement on Monday, Statin  - MRI Brain, MRA Head/Neck + For CVA  - Rehab consult  - Neurology consult  - NPO   - PT/OT/ST  - Allow permissivehypertension    Expressive aphasia  Due to Acute CVA  Neuro  Rehab  ST       Essential hypertension  - Allow permissive hypertension  - Intervene if SBP > 220 and DBP > 120      Tobacco abuse   on cessation      VTE Risk Mitigation         Ordered     enoxaparin injection 40 mg  Daily     Route:  Subcutaneous        03/30/17 0013     Medium Risk of VTE  Once      03/30/17 0013     Place sequential compression device  Until discontinued      03/30/17 0013     Reason for No Pharmacological VTE Prophylaxis  Once      03/30/17 0013      45 mins cc Time    Alvin Joseph MD  Department of Hospital Medicine   [facility] -

## 2017-03-31 NOTE — PROGRESS NOTES
Progress Note  Neurological ICU    Admit Date: 3/29/2017   LOS: 2 days     SUBJECTIVE:     Follow-up For:  Stroke with left hemiparesis due to right pontine infarct.  The patient has started PT/OT.  She failed the initial swallow evaluation.  She remains with emotional incontinence, frequently crying and wailing uncontrollably.  In therapy, she is able to transfer with max assist and sitting balance is poor due to leaning to the left.  She did complain of left arm pain last PM, but this AM denies pain.    Continuous Infusions:   sodium chloride 0.9% 75 mL/hr at 03/31/17 0600    sodium chloride 0.9%       Scheduled Meds:   albuterol-ipratropium 2.5mg-0.5mg/3mL  3 mL Nebulization Q4H    arformoterol  15 mcg Nebulization BID    aspirin  300 mg Rectal Daily    budesonide  0.5 mg Nebulization Q12H    enoxaparin  40 mg Subcutaneous Daily    morphine  1 mg Intravenous Once    pantoprazole  40 mg Intravenous Daily    pravastatin  40 mg Oral Daily     PRN Meds:labetalol, lorazepam, ondansetron, sodium chloride 0.9%    Review of patient's allergies indicates:  No Known Allergies    OBJECTIVE:     Vital Signs (Most Recent)  Temp: 98 °F (36.7 °C) (03/31/17 0305)  Pulse: 69 (03/31/17 0600)  Resp: (!) 24 (03/31/17 0600)  BP: (!) 159/84 (03/31/17 0600)  SpO2: 95 % (03/31/17 0600)    Vital Signs Range (Last 24H):  Temp:  [97.8 °F (36.6 °C)-98.1 °F (36.7 °C)]   Pulse:  []   Resp:  [15-34]   BP: (138-208)/()   SpO2:  [92 %-100 %]     I & O (Last 24H):  Intake/Output Summary (Last 24 hours) at 03/31/17 0728  Last data filed at 03/31/17 0600   Gross per 24 hour   Intake              825 ml   Output              585 ml   Net              240 ml     Ventilator Data (Last 24H):     Oxygen Concentration (%):  [28-32] 28    Hemodynamic Parameters (Last 24H):       Physical Exam:  General: appears acutely ill, moderate distress  Head: normocephalic, atraumatic  Neck: supple, symmetrical, trachea midline, no JVD  Lungs:   clear to auscultation bilaterally  Heart: regular rate and rhythm  Abdomen: soft, non-tender non-distented; bowel sounds normal; no masses,  no organomegaly  Neurologic: Mental status: alertness: alert, orientation: person, affect: sad, anxious, thought content exhibits constantly crying  Cranial nerves: II: pupils direct pupil reaction to light bilaterally, consensual pupil reaction to light bilaterally, III,IV,VI: extraocular muscles extra-ocular motions intact, V,VII: corneal reflex present bilaterally, VII: lower facial muscle function reduced on the left, XII: tongue strength equivocal   Motor:Markedly increased tone, left upper extremity with arm in extension, but the tone can be broken with PROM.  She has very active movements of both legs, but not to command.  Has active movement of the right arm and leg.  Does not follow commands well.  Reflexes: biceps reflex (C-5 to C-6) right and left 1/4  quadriceps reflex (L-2 to L-4) left 2/4  brachioradialis reflex (C-5 to C-6) left 2/4  Has prompt withdrawal with toe exentsion bilaterally to plantar stimulation.  Coordination: No resting tremor.  Markedly increased tone on the left, particularly the left arm and hand.    Lines/Drains:       Peripheral IV - Single Lumen 03/29/17 Right Antecubital (Active)   Site Assessment Clean;Dry;Intact;No redness;No swelling 3/31/2017  3:05 AM   Line Status Flushed;Saline locked 3/31/2017  3:05 AM   Dressing Status Clean;Dry;Intact 3/31/2017  3:05 AM   Dressing Change Due 04/02/17 3/31/2017  3:05 AM   Site Change Due 03/02/18 3/30/2017 11:00 AM   Reason Not Rotated Not due 3/31/2017  3:05 AM   Number of days:2            Peripheral IV - Single Lumen 03/29/17 Right Hand (Active)   Site Assessment Clean;Dry;Intact;No redness;No swelling 3/31/2017  3:05 AM   Line Status Infusing 3/31/2017  3:05 AM   Dressing Status Clean;Dry;Intact 3/31/2017  3:05 AM   Dressing Change Due 04/02/17 3/31/2017  3:05 AM   Site Change Due 04/02/17  "3/30/2017  7:05 PM   Reason Not Rotated Not due 3/31/2017  3:05 AM   Number of days:2            Urethral Catheter 03/30/17 0301 (Active)   Site Assessment Clean;Intact 3/31/2017  3:05 AM   Collection Container Urimeter 3/31/2017  3:05 AM   Securement Method secured to top of thigh w/ adhesive device 3/31/2017  3:05 AM   Catheter Care Performed yes 3/31/2017  3:05 AM   Reason for Continuing Urinary Catheterization Critically ill in ICU requiring intensive monitoring 3/31/2017  3:05 AM   CAUTI Prevention Bundle StatLock in place w 1" slack;Intact seal between catheter & drainage tubing;Drainage bag off the floor;Green sheeting clip in use;No dependent loops or kinks;Drainage bag not overfilled (<2/3 full);Drainage bag below bladder 3/30/2017 11:05 PM   Output (mL) 30 mL 3/31/2017  6:00 AM   Number of days:1       Laboratory (Last 24H):  CBC:    Recent Labs  Lab 03/31/17  0326   WBC 11.41   HGB 15.2   HCT 44.1        CMP:    Recent Labs  Lab 03/31/17 0326   CALCIUM 8.7   ALBUMIN 3.4*   PROT 6.0      K 4.1   CO2 22*      BUN 24*   CREATININE 0.9   ALKPHOS 95   ALT 16   AST 20   BILITOT 0.8     BMP:   Recent Labs  Lab 03/31/17  0326   GLU 78      K 4.1      CO2 22*   BUN 24*   CREATININE 0.9   CALCIUM 8.7     Labs within the past 24 hours have been reviewed.    Chest X-Ray: normal    Diagnostic Results:  MRI: Reviewed  The MRI clearly shows acute stroke, right britney    ASSESSMENT/PLAN:     Preventive Measures: Nutrition: Goal: Repeat swallow evaluation before offering a diet, DVT: continue prophyllaxis, Head of Bet: Keep elevated to 30 degrees, Physical Therapy: continue active PT/OT/ST        Plan:  Neuro: Patient will need in patient rehab.  Continue control of blood pressure, around 140/80 if possible.  May need to start antidepressant when she is able to swallow.    Counseling/Consultation:Discussed with ICU personnel.    Critical Care Time greater than: 1 Hour  "

## 2017-03-31 NOTE — ASSESSMENT & PLAN NOTE
Nutrition Problem:   Inadequate energy intake    Etiology/Related to:   Swallowing difficulty    As evidenced by:  Inability to safely consume oral diet with no nutrition support    Treatment Strategy:   1. Enteral nutrition until oral diet is achieved (Isosource 1.5 at 45ml/hr with 200ml flush every 6 hours)    Nutrition Diagnosis Status:   New

## 2017-03-31 NOTE — PROGRESS NOTES
Attempted to administer 1mg of Morphine IVP to patient, defective vials. 2 (2mg) vials wasted and witnessed by Parvin AHWLEY RN. Contacted pharmacy to notify them of the defective vials, received 2mg vial from pharmacy. 1mg wasted from vial, witnessed by Ximena MORRIS RN.

## 2017-03-31 NOTE — PT/OT/SLP PROGRESS
Occupational Therapy  Treatment    Christine Mcgill   MRN: [id number]   Admitting Diagnosis: Acute CVA (cerebrovascular accident)    OT Date of Treatment: 17   OT Start Time: 09  OT Stop Time: 1020  OT Total Time (min): 25 min    Billable Minutes:  Therapeutic Activity 25 minutes    General Precautions: Standard, fall  Orthopedic Precautions: N/A  Braces:           Subjective:  Communicated with nurse nolan rivera epic chart review prior to session.      Pain Ratin/10                   Objective:  Patient found with: peripheral IV, alvarez catheter, telemetry     Functional Mobility:  Bed Mobility:  Rolling/Turning to Left: With assist of 2  Rolling/Turning Right: Maximum assistance, With assist of 2  Scooting/Bridging: Maximum Assistance, With assist of 2  Sit to Supine: Maximum Assistance, With assist of 2    Transfers:   Sit <> Stand Assistance: Maximum Assistance (x2)  Sit <> Stand Assistive Device: Rolling Walker  Bed <> Chair Technique: Stand Pivot  Bed <> Chair Transfer Assistance: Maximum Assistance (x2)  Bed <> Chair Assistive Device: No Assistive Device (hand held assist)    Functional Ambulation: pt unable to ambulate.     Activities of Daily Living:     Feeding adaptive equipment: na  UE Dressing Level of Assistance: Maximum assistance  UE adaptive equipment: na    LE Dressing Level of Assistance: Maximum assistance  LE adaptive equipment: na  Grooming Position: Seated  Grooming Level of Assistance: Maximum assistance  Toileting Where Assessed: Bed level  Toileting Level of Assistance: Total assistance        Bathing adaptive equipment: na    Balance:   Static Sit: POOR: Needs MODERATE assist to maintain  Dynamic Sit: POOR: N/A  Static Stand: 0: Needs MAXIMAL assist to maintain   Dynamic stand: 0: N/A    Therapeutic Activities and Exercises:  Pt stood x 3 with max a x2 with weight shifting and stabilization of knees to prevent buckling. Pt req max vc's for sitting balance. Pt performed weight bearing  exercise to l side(ue) via reaching activity. Pt emotional outburst of cries during tx session.    AM-PAC 6 CLICK ADL   How much help from another person does this patient currently need?   1 = Unable, Total/Dependent Assistance  2 = A lot, Maximum/Moderate Assistance  3 = A little, Minimum/Contact Guard/Supervision  4 = None, Modified [place]/Independent          AM-PAC Raw Score CMS G-Code Modifier Level of Impairment Assistance   6 % Total / Unable   7 - 9 CM 80 - 100% Maximal Assist   10 - 14 CL 60 - 80% Moderate Assist   15 - 19 CK 40 - 60% Moderate Assist   20 - 22 CJ 20 - 40% Minimal Assist   23 CI 1-20% SBA / CGA   24 CH 0% Independent/ Mod I     Patient left hob elevated and l eft ue with all lines intact, call button in reach, nurse nolan  notified and nurse nolan present    ASSESSMENT:  Christine Mcgill is a 61 y.o. female with a medical diagnosis of Acute CVA (cerebrovascular accident) and presents with l eft side weakness.    Rehab identified problem list/impairments: Rehab identified problem list/impairments: weakness, impaired functional mobilty, impaired balance, decreased upper extremity function, decreased safety awareness, impaired coordination, impaired skin, impaired endurance, impaired self care skills, gait instability, decreased coordination, decreased lower extremity function, pain    Rehab potential is good.    Activity tolerance: Fair    Discharge recommendations: Discharge Facility/Level Of Care Needs: rehabilitation facility     Barriers to discharge: Barriers to Discharge: Decreased caregiver support, Inaccessible home environment    Equipment recommendations: walker, rolling, bedside commode, bath bench     GOALS:   Occupational Therapy Goals        Problem: Occupational Therapy Goal    Goal Priority Disciplines Outcome Interventions   Occupational Therapy Goal     OT, PT/OT Ongoing (interventions implemented as appropriate)    Description:  ot goals to be met by  4-7-17  1. Pt will tolerate 1 set x 10 reps r ue arom and l ue aarom exercise  2. Min a with ue dressing  3. Min a with le dressing                 Plan:  Patient to be seen 3 x/week to address the above listed problems via self-care/home management, therapeutic exercises, therapeutic activities  Plan of Care expires: 04/06/17  Plan of Care reviewed with: spouse         Katerinemajor Marinellizier, OT  03/31/2017

## 2017-03-31 NOTE — PROGRESS NOTES
Pt up in chair during swallow eval with speech therapy.  Pt continues to have frequent periods of emotional incontinence.  Family at bedside.

## 2017-03-31 NOTE — PLAN OF CARE
Problem: Patient Care Overview  Goal: Plan of Care Review  PT REQUIRES MAX A X 2 FOR BED MOBILITY AND T/F TO CHAIR.   Outcome: Ongoing (interventions implemented as appropriate)  Patient remains on nc with no changes. Patient tolerated neb tx. BS are diminished with ins/exp wheezing. Will follow.

## 2017-03-31 NOTE — PLAN OF CARE
[facility] this am to evaluate. Discussed patient having K-feeding tube with Yasmin. Yasmin is unsure if patient can have [facility] tube [facility].   [facility] received [facility] from [facility], spoke with Inna, additional patient information faxed to [facility] via [facility]. Inna states [facility] cannot accept patient with K-feeding tube. CM awaiting speech evaluation results.   MITUL received call return from Yasmin who states okay for patient to have K-feeding tube, but must be short term only.

## 2017-03-31 NOTE — SUBJECTIVE & OBJECTIVE
No current facility-administered medications on file prior to encounter.      No current outpatient prescriptions on file prior to encounter.       Review of patient's allergies indicates:  No Known Allergies    Past Medical History:   Diagnosis Date    COPD (chronic obstructive pulmonary disease)     HTN (hypertension)     Hypercholesteremia      History reviewed. No pertinent surgical history.  Family History     Family history is unknown by patient.        Social History Main Topics    Smoking status: Current Every Day Smoker    Smokeless tobacco: Not on file    Alcohol use No    Drug use: No    Sexual activity: Not on file     Review of Systems   Reason unable to perform ROS: aphasia.     Objective:     Vital Signs (Most Recent):  Temp: 97.8 °F (36.6 °C) (03/31/17 1100)  Pulse: 92 (03/31/17 1237)  Resp: 19 (03/31/17 1237)  BP: (!) 181/79 (03/31/17 1200)  SpO2: 100 % (03/31/17 1237) Vital Signs (24h Range):  Temp:  [97.8 °F (36.6 °C)-98.1 °F (36.7 °C)] 97.8 °F (36.6 °C)  Pulse:  [] 92  Resp:  [17-34] 19  SpO2:  [92 %-100 %] 100 %  BP: (138-208)/() 181/79     Weight: 86.8 kg (191 lb 5.8 oz)  Body mass index is 37.37 kg/(m^2).    Physical Exam   Constitutional: She appears distressed.   Pulmonary/Chest: Effort normal. No respiratory distress.   Musculoskeletal: She exhibits no edema.   Neurological: She is alert.   Skin: No rash noted. No erythema. No pallor.   Psychiatric: Noncommunicative: due to aphasia.

## 2017-03-31 NOTE — PLAN OF CARE
Problem: Patient Care Overview  Goal: Interdisciplinary Rounds/Family Conf  Outcome: Ongoing (interventions implemented as appropriate)  Patient was given Labetolol for DBP greater than 120 once, and has remain within goal afterwards. Tmax 98.0, HR in NSR. Pt c/o of pain to LUE when repositioned. Pt's speech remain slurred and hard to understand at times,AAOX3. No change with movement of extremities.  POC discussed with patient.

## 2017-03-31 NOTE — ASSESSMENT & PLAN NOTE
Plan for PEG on Monday.   Hold aspirin (received today).  The risks of PEG placement including bleeding, infection were explained to the patient and her family and they expressed understanding.  The nature of the patient's condition, probability of success, risks of refusing treatment, and alternatives and risks of the alternatives were also explained.

## 2017-03-31 NOTE — PROCEDURES
Modifed Barium Swallow Study  Speech Start Time: 1300  Speech Stop Time: 1345  Speech Total (min): 45 min    SLP Treatment Date: 03/31/17    Reason for Referral  Patient was referred for a Modified Barium Swallow Study to assess the efficiency of his/her swallow function, rule out aspiration and make recommendations regarding safe dietary consistencies, effective compensatory strategies, and safe eating environment.     Diagnosis   Acute CVA (cerebrovascular accident)    Past Medical History:   Diagnosis Date    COPD (chronic obstructive pulmonary disease)     HTN (hypertension)     Hypercholesteremia      History reviewed. No pertinent surgical history.     General Precautions: aspiration, fall  Current Respiratory Status: nasal cannula    Recommendations  ST recommends continues NPO with alternative source for nutrition/hydration.    Oral Peripheral Examination  Oral Musculature Evaluation  Oral Musculature: facial asymmetry present, right weakness  Secretion Management: left corner drooling  Mucosal Quality: good    Consistencies Assessed  Thin liquids and puree with barium was assessed fluoroscopically.     Oral Preparation / Oral Phase  - pt with decreased oral prep and transport with premature spillage into valleculae and pyriforms at times  - oral residue following swallow with pt blowing out food particles when she breathes    Pharyngeal Phase  - decreased swallow coordination and timing with aspiration of tested consistencies  - delayed onset of swallow with bolus falling into vestibule prior to swallow with aspiration during the swallow  - decreased breathing and swallowing coordination causing pt to deeply inhale with bolus in oral cavity  - decreased laryngeal elevation and pharyngeal wall squeezing resulting in pharyngeal residue     Cervical Esophageal Phase  - not able to completely assess secondary to pt's positioning and size     Impressions  Oropharyngeal dysphagia characterized by poor  breathing and swallow coordination, decreased muscle strength and ROM, oropharyngeal residue, and aspiration of liquids and puree.  Pt's aspiration episodes are inconsistent as well as her defensive cough response.  Her breathing pattern during po intake is described as a deep inhale followed by holding her breath as bolus enters her mouth then will exhale forcefully prior to swallowing.  Pt is not safe for po intake at this time and will require alternative source for nutrition/hydration.      Prognosis/Plan/Education  ST provided extensive education on dysphagia, aspiration, tube feeding, ST POC and rehab.  ST will continue with POC.    Care Plan   SLP Goals        Problem: SLP Goal    Goal Priority Disciplines Outcome   SLP Goal     SLP Ongoing (interventions implemented as appropriate)   Description:  1. Ongoing swallow assessment with MBSS when warranted to establish safest diet  2. Pt will complete expressive speech tasks with mod cueing for word finding  3. Pt will attend to tasks for 2 minutes with min cueing  4. Pt will complete oral and pharyngeal ex's with min cueing

## 2017-03-31 NOTE — PROGRESS NOTES
Patient grimacing and crying with repositioning of LUE. When asked if movement of her LUE is painful, patient nodded head yes. Contacted eICU to notify them of the patient's pain, order placed for pain medication. Will continue to monitor pt.

## 2017-03-31 NOTE — PLAN OF CARE
Problem: Occupational Therapy Goal  Goal: Occupational Therapy Goal  ot goals to be met by 4-7-17  1. Pt will tolerate 1 set x 10 reps r ue arom and l ue aarom exercise  2. Min a with ue dressing  3. Min a with le dressing   Outcome: Ongoing (interventions implemented as appropriate)  Pt continues to working on functional t/f's. And le dressing

## 2017-03-31 NOTE — PT/OT/SLP PROGRESS
Speech Language Pathology  Treatment    Christine Mcgill   MRN: [id number]   Admitting Diagnosis: Acute CVA (cerebrovascular accident)    Diet recommendations: Solid Diet Level: NPO  Liquid Diet Level: NPO     SLP Treatment Date: 17  Speech Start Time: 0850     Speech Stop Time: 920     Speech Total (min): 30 min       TREATMENT BILLABLE MINUTES:  Speech Therapy Individual 15 and Treatment Swallowing Dysfunction 15    Has the patient been evaluated by SLP for swallowing? : Yes  Keep patient NPO?: Yes   General Precautions: Standard, aspiration, fall  Current Respiratory Status: nasal cannula       Subjective:  Pt labile with outbursts of crying. Pt's daughter attempted to calm her.     Pain Ratin/10              Pain Rating Post-Intervention: 0/10    Objective:   Patient found with: peripheral IV, alvarez catheter, pulse ox (continuous)  Swallow re-assessed at bedside. Pt took a bite of puree and held it in her mouth and began crying, the bolus moved posteriorly with gulps of air while crying placing her at risk of aspiration by sucking bolus into her airway. She eventually swallowed bolus, with max cues to swallow, and had sufficient laryngeal elevation and no overt s/s of aspiration. She completed tongue base retraction and oral motor ex x 10 each with max cues. She named objects with 100% acc and stated function with 100% acc.   FIM:                                 Assessment:  Christine Mcgill is a 61 y.o. female with a medical diagnosis of Acute CVA (cerebrovascular accident) and presents with dysphagia, aphasia.    Discharge recommendations: Discharge Facility/Level Of Care Needs: rehabilitation facility     Goals:   SLP Goals        Problem: SLP Goal    Goal Priority Disciplines Outcome   SLP Goal     SLP Ongoing (interventions implemented as appropriate)   Description:  1. Ongoing swallow assessment with MBSS when warranted to establish safest diet  2. Pt will complete expressive speech tasks with mod  cueing for word finding  3. Pt will attend to tasks for 2 minutes with min cueing  4. Pt will complete oral and pharyngeal ex's with min cueing                 Plan:   Patient to be seen Therapy Frequency: 5 x/week   Plan of Care expires: 04/06/17  Plan of Care reviewed with: patient, daughter  SLP Follow-up?: Yes              Lynsey Yanes CCC-SLP  03/31/2017

## 2017-03-31 NOTE — ASSESSMENT & PLAN NOTE
- - With expressive aphasia  - Hold Aspirin pending PEG placement on Monday, Statin  - MRI Brain, MRA Head/Neck + For CVA  - Rehab consult  - Neurology consult  - NPO   - PT/OT/ST  - Allow permissivehypertension

## 2017-04-01 PROBLEM — J44.9 COPD (CHRONIC OBSTRUCTIVE PULMONARY DISEASE): Status: ACTIVE | Noted: 2017-04-01

## 2017-04-01 LAB
ALBUMIN SERPL BCP-MCNC: 3.2 G/DL
ALP SERPL-CCNC: 85 U/L
ALT SERPL W/O P-5'-P-CCNC: 21 U/L
ANION GAP SERPL CALC-SCNC: 11 MMOL/L
AST SERPL-CCNC: 24 U/L
BASOPHILS # BLD AUTO: 0.06 K/UL
BASOPHILS NFR BLD: 0.6 %
BILIRUB SERPL-MCNC: 0.9 MG/DL
BUN SERPL-MCNC: 17 MG/DL
CALCIUM SERPL-MCNC: 8.1 MG/DL
CHLORIDE SERPL-SCNC: 111 MMOL/L
CO2 SERPL-SCNC: 19 MMOL/L
CREAT SERPL-MCNC: 0.8 MG/DL
DIFFERENTIAL METHOD: NORMAL
EOSINOPHIL # BLD AUTO: 0.2 K/UL
EOSINOPHIL NFR BLD: 2.1 %
ERYTHROCYTE [DISTWIDTH] IN BLOOD BY AUTOMATED COUNT: 13.5 %
EST. GFR  (AFRICAN AMERICAN): >60 ML/MIN/1.73 M^2
EST. GFR  (NON AFRICAN AMERICAN): >60 ML/MIN/1.73 M^2
GLUCOSE SERPL-MCNC: 54 MG/DL
HCT VFR BLD AUTO: 42.6 %
HGB BLD-MCNC: 14.4 G/DL
LYMPHOCYTES # BLD AUTO: 3.2 K/UL
LYMPHOCYTES NFR BLD: 34.7 %
MCH RBC QN AUTO: 30.4 PG
MCHC RBC AUTO-ENTMCNC: 33.8 %
MCV RBC AUTO: 90 FL
MONOCYTES # BLD AUTO: 1 K/UL
MONOCYTES NFR BLD: 10.7 %
NEUTROPHILS # BLD AUTO: 4.8 K/UL
NEUTROPHILS NFR BLD: 51.9 %
PLATELET # BLD AUTO: 174 K/UL
PMV BLD AUTO: 10.3 FL
POTASSIUM SERPL-SCNC: 4 MMOL/L
PROT SERPL-MCNC: 5.7 G/DL
RBC # BLD AUTO: 4.74 M/UL
SODIUM SERPL-SCNC: 141 MMOL/L
WBC # BLD AUTO: 9.31 K/UL

## 2017-04-01 PROCEDURE — C9113 INJ PANTOPRAZOLE SODIUM, VIA: HCPCS | Performed by: EMERGENCY MEDICINE

## 2017-04-01 PROCEDURE — 94640 AIRWAY INHALATION TREATMENT: CPT

## 2017-04-01 PROCEDURE — 25000003 PHARM REV CODE 250: Performed by: NURSE PRACTITIONER

## 2017-04-01 PROCEDURE — 92507 TX SP LANG VOICE COMM INDIV: CPT

## 2017-04-01 PROCEDURE — 25000242 PHARM REV CODE 250 ALT 637 W/ HCPCS: Performed by: INTERNAL MEDICINE

## 2017-04-01 PROCEDURE — 63600175 PHARM REV CODE 636 W HCPCS: Performed by: NURSE PRACTITIONER

## 2017-04-01 PROCEDURE — 63600175 PHARM REV CODE 636 W HCPCS: Performed by: INTERNAL MEDICINE

## 2017-04-01 PROCEDURE — 97530 THERAPEUTIC ACTIVITIES: CPT

## 2017-04-01 PROCEDURE — 63600175 PHARM REV CODE 636 W HCPCS: Performed by: SURGERY

## 2017-04-01 PROCEDURE — 97110 THERAPEUTIC EXERCISES: CPT

## 2017-04-01 PROCEDURE — 27000221 HC OXYGEN, UP TO 24 HOURS

## 2017-04-01 PROCEDURE — 99499 UNLISTED E&M SERVICE: CPT | Mod: ,,, | Performed by: SURGERY

## 2017-04-01 PROCEDURE — 36415 COLL VENOUS BLD VENIPUNCTURE: CPT

## 2017-04-01 PROCEDURE — 85025 COMPLETE CBC W/AUTO DIFF WBC: CPT

## 2017-04-01 PROCEDURE — 63600175 PHARM REV CODE 636 W HCPCS: Performed by: EMERGENCY MEDICINE

## 2017-04-01 PROCEDURE — 80053 COMPREHEN METABOLIC PANEL: CPT

## 2017-04-01 PROCEDURE — 21400001 HC TELEMETRY ROOM

## 2017-04-01 PROCEDURE — 99291 CRITICAL CARE FIRST HOUR: CPT | Mod: ,,, | Performed by: PSYCHIATRY & NEUROLOGY

## 2017-04-01 PROCEDURE — 92526 ORAL FUNCTION THERAPY: CPT

## 2017-04-01 RX ORDER — SODIUM CHLORIDE, SODIUM LACTATE, POTASSIUM CHLORIDE, CALCIUM CHLORIDE 600; 310; 30; 20 MG/100ML; MG/100ML; MG/100ML; MG/100ML
INJECTION, SOLUTION INTRAVENOUS CONTINUOUS
Status: DISCONTINUED | OUTPATIENT
Start: 2017-04-01 | End: 2017-04-01

## 2017-04-01 RX ORDER — LIDOCAINE HYDROCHLORIDE 10 MG/ML
1 INJECTION, SOLUTION EPIDURAL; INFILTRATION; INTRACAUDAL; PERINEURAL ONCE
Status: COMPLETED | OUTPATIENT
Start: 2017-04-01 | End: 2017-04-03

## 2017-04-01 RX ADMIN — IPRATROPIUM BROMIDE AND ALBUTEROL SULFATE 3 ML: .5; 3 SOLUTION RESPIRATORY (INHALATION) at 12:04

## 2017-04-01 RX ADMIN — PANTOPRAZOLE SODIUM 40 MG: 40 INJECTION, POWDER, FOR SOLUTION INTRAVENOUS at 09:04

## 2017-04-01 RX ADMIN — IPRATROPIUM BROMIDE AND ALBUTEROL SULFATE 3 ML: .5; 3 SOLUTION RESPIRATORY (INHALATION) at 11:04

## 2017-04-01 RX ADMIN — IPRATROPIUM BROMIDE AND ALBUTEROL SULFATE 3 ML: .5; 3 SOLUTION RESPIRATORY (INHALATION) at 03:04

## 2017-04-01 RX ADMIN — BUDESONIDE 0.5 MG: 0.5 SUSPENSION RESPIRATORY (INHALATION) at 07:04

## 2017-04-01 RX ADMIN — IPRATROPIUM BROMIDE AND ALBUTEROL SULFATE 3 ML: .5; 3 SOLUTION RESPIRATORY (INHALATION) at 08:04

## 2017-04-01 RX ADMIN — BUDESONIDE 0.5 MG: 0.5 SUSPENSION RESPIRATORY (INHALATION) at 08:04

## 2017-04-01 RX ADMIN — SODIUM CHLORIDE: 0.9 INJECTION, SOLUTION INTRAVENOUS at 11:04

## 2017-04-01 RX ADMIN — IPRATROPIUM BROMIDE AND ALBUTEROL SULFATE 3 ML: .5; 3 SOLUTION RESPIRATORY (INHALATION) at 07:04

## 2017-04-01 RX ADMIN — MORPHINE SULFATE 2 MG: 2 INJECTION, SOLUTION INTRAMUSCULAR; INTRAVENOUS at 11:04

## 2017-04-01 RX ADMIN — ARFORMOTEROL TARTRATE 15 MCG: 15 SOLUTION RESPIRATORY (INHALATION) at 08:04

## 2017-04-01 RX ADMIN — SODIUM CHLORIDE: 0.9 INJECTION, SOLUTION INTRAVENOUS at 08:04

## 2017-04-01 RX ADMIN — MORPHINE SULFATE 2 MG: 2 INJECTION, SOLUTION INTRAMUSCULAR; INTRAVENOUS at 08:04

## 2017-04-01 RX ADMIN — ARFORMOTEROL TARTRATE 15 MCG: 15 SOLUTION RESPIRATORY (INHALATION) at 07:04

## 2017-04-01 RX ADMIN — ENOXAPARIN SODIUM 40 MG: 100 INJECTION SUBCUTANEOUS at 05:04

## 2017-04-01 RX ADMIN — SODIUM CHLORIDE: 0.9 INJECTION, SOLUTION INTRAVENOUS at 01:04

## 2017-04-01 RX ADMIN — MORPHINE SULFATE 2 MG: 2 INJECTION, SOLUTION INTRAMUSCULAR; INTRAVENOUS at 05:04

## 2017-04-01 NOTE — PLAN OF CARE
"Problem: Patient Care Overview  Goal: Plan of Care Review  PT REQUIRES MAX A X 2 FOR BED MOBILITY AND T/F TO CHAIR.   Outcome: Ongoing (interventions implemented as appropriate)  Patient was very restless at the beginning of the shift. Patient was pulling her legs up to her moaning out load.  When i questioned her she would only say "hurt".  I asked her if her legs were hurting and she shook her head yes.  I called Dr Olsen and he ordered her some morphine.  Within moments of administration patient calmed down and rested. As patient relaxed her blood pressure came back to acceptable numbers per our prn protocol of labetalol.  See flow sheet for bp values.        "

## 2017-04-01 NOTE — SUBJECTIVE & OBJECTIVE
Interval History: Improving Speech. L Side weakness / abnormal coordination remains. Less Emotional this morning    Review of Systems   Constitutional: Positive for activity change and fatigue. Negative for unexpected weight change.   HENT: Negative.  Negative for trouble swallowing.    Eyes: Negative.    Respiratory: Negative.  Negative for cough, shortness of breath and wheezing.    Cardiovascular: Negative.  Negative for chest pain.   Gastrointestinal: Negative.    Endocrine: Negative.    Genitourinary: Negative.    Musculoskeletal: Negative.    Skin: Negative.    Allergic/Immunologic: Negative.    Neurological: Positive for speech difficulty and weakness. Negative for dizziness.   Hematological: Negative.    Psychiatric/Behavioral: Negative.    All other systems reviewed and are negative.    Objective:     Vital Signs (Most Recent):  Temp: 98.1 °F (36.7 °C) (04/01/17 0700)  Pulse: 78 (04/01/17 0904)  Resp: (!) 22 (04/01/17 0904)  BP: (!) 206/91 (04/01/17 0904)  SpO2: 95 % (04/01/17 0904) Vital Signs (24h Range):  Temp:  [97.8 °F (36.6 °C)-98.4 °F (36.9 °C)] 98.1 °F (36.7 °C)  Pulse:  [] 78  Resp:  [17-26] 22  SpO2:  [93 %-100 %] 95 %  BP: (155-213)/() 206/91     Weight: 87 kg (191 lb 12.8 oz)  Body mass index is 37.46 kg/(m^2).    Intake/Output Summary (Last 24 hours) at 04/01/17 0926  Last data filed at 04/01/17 0700   Gross per 24 hour   Intake             2575 ml   Output              885 ml   Net             1690 ml      Physical Exam   Constitutional: She is oriented to person, place, and time. She appears well-developed and well-nourished.   HENT:   Head: Normocephalic and atraumatic.   Eyes: EOM are normal. Pupils are equal, round, and reactive to light.   Neck: Normal range of motion. Neck supple. No JVD present.   Cardiovascular: Normal rate, regular rhythm, normal heart sounds and intact distal pulses.    No murmur heard.  Pulmonary/Chest: No respiratory distress. She has wheezes.   +  Sparse Wheeze B/L Lungs   Abdominal: Soft. Bowel sounds are normal. She exhibits no distension.   Musculoskeletal: Normal range of motion. She exhibits no edema or tenderness.   Neurological: She is alert and oriented to person, place, and time. She has normal reflexes. No cranial nerve deficit. She exhibits abnormal muscle tone. Coordination abnormal.   Skin: Skin is warm and dry. No erythema.   Nursing note and vitals reviewed.      Significant Labs:   BMP:   Recent Labs  Lab 04/01/17 0358   GLU 54*      K 4.0   *   CO2 19*   BUN 17   CREATININE 0.8   CALCIUM 8.1*     CBC:   Recent Labs  Lab 03/31/17 0326 04/01/17 0358   WBC 11.41 9.31   HGB 15.2 14.4   HCT 44.1 42.6    174     CMP:   Recent Labs  Lab 03/31/17 0326 04/01/17 0358    141   K 4.1 4.0    111*   CO2 22* 19*   GLU 78 54*   BUN 24* 17   CREATININE 0.9 0.8   CALCIUM 8.7 8.1*   PROT 6.0 5.7*   ALBUMIN 3.4* 3.2*   BILITOT 0.8 0.9   ALKPHOS 95 85   AST 20 24   ALT 16 21   ANIONGAP 10 11   EGFRNONAA >60 >60     Cardiac Markers: No results for input(s): CKMB, MYOGLOBIN, BNP, TROPISTAT in the last 48 hours.  Troponin: No results for input(s): TROPONINI in the last 48 hours.  All pertinent labs within the past 24 hours have been reviewed.    Significant Imaging: I have reviewed all pertinent imaging results/findings within the past 24 hours.

## 2017-04-01 NOTE — PROGRESS NOTES
Progress Note  Neurological ICU    Admit Date: 3/29/2017   LOS: 3 days     SUBJECTIVE:     Follow-up For:  Stroke with left hemiparesis.  The patient has had swallow study, which she failed and is now waiting PEG placement.  She remains with dense weakness of the left hand and arm, with emotional incontinence (almost a pseudobulbar palsy).  She remains with very poor oral motor control.  She denies pain to me.  ASA currently on hold, pending PEG placement.    Continuous Infusions:   sodium chloride 0.9% 125 mL/hr at 04/01/17 0600    lactated ringers      sodium chloride 0.9%       Scheduled Meds:   albuterol-ipratropium 2.5mg-0.5mg/3mL  3 mL Nebulization Q4H    arformoterol  15 mcg Nebulization BID    budesonide  0.5 mg Nebulization Q12H    enoxaparin  40 mg Subcutaneous Daily    lidocaine (PF) 10 mg/ml (1%)  1 mL Intradermal Once    morphine  1 mg Intravenous Once    pantoprazole  40 mg Intravenous Daily    pravastatin  40 mg Oral Daily     PRN Meds:labetalol, lorazepam, morphine, morphine, ondansetron, sodium chloride 0.9%    Review of patient's allergies indicates:  No Known Allergies    OBJECTIVE:     Vital Signs (Most Recent)  Temp: 98.2 °F (36.8 °C) (04/01/17 0305)  Pulse: 69 (04/01/17 0716)  Resp: (!) 24 (04/01/17 0716)  BP: (!) 170/92 (04/01/17 0600)  SpO2: 96 % (04/01/17 0716)    Vital Signs Range (Last 24H):  Temp:  [97.8 °F (36.6 °C)-98.4 °F (36.9 °C)]   Pulse:  []   Resp:  [17-26]   BP: (155-213)/()   SpO2:  [93 %-100 %]     I & O (Last 24H):  Intake/Output Summary (Last 24 hours) at 04/01/17 0741  Last data filed at 04/01/17 0600   Gross per 24 hour   Intake             2700 ml   Output              895 ml   Net             1805 ml     Ventilator Data (Last 24H):     Oxygen Concentration (%):  [28] 28    Hemodynamic Parameters (Last 24H):       Physical Exam:  General: appears acutely ill, mild distress, moderately obese  Head: normocephalic, atraumatic  Eyes:  pupils  responsive  Lungs:  clear to auscultation bilaterally  Heart: regular rate and rhythm  Abdomen: soft, non-tender non-distented; bowel sounds normal; no masses,  no organomegaly  Neurologic: Mental status: alertness: alert, affect: labile, sad, anxious  Cranial nerves: II: visual field normal, II: pupils equal, round, reactive to light and accommodation, III,IV,VI: extraocular muscles extra-ocular motions intact, V: facial light touch sensation equivocal on the left, VII: lower facial muscle function reduced on the left, VIII: hearing normal, XII: tongue strength reduced   Sensory: two point discrimination Patient does not seem to extinct double simultaneious sensory stimulous.  She is able to localize touch on the left. present generalized on the left  Motor:Marked increase in tone left hand and arm, developing contractures in the fingers of the left hand, with increased tone at the wrist and elbow as well.  Does move the left ankle and foot to command.  Active, purposeful movement of the right side to command.  Reflexes: Bilateral Babinski signs.    Lines/Drains:       Peripheral IV - Single Lumen 03/29/17 Right Antecubital (Active)   Site Assessment Clean;Dry;Intact;No redness;No swelling 4/1/2017  3:05 AM   Line Status Flushed;Saline locked 4/1/2017  3:05 AM   Dressing Status Clean;Dry;Intact 4/1/2017  3:05 AM   Dressing Change Due 04/02/17 3/31/2017  3:01 PM   Site Change Due 03/02/18 3/30/2017 11:00 AM   Reason Not Rotated Not due 4/1/2017  3:05 AM   Number of days:3            Peripheral IV - Single Lumen 03/29/17 Right Hand (Active)   Site Assessment Clean;Dry;Intact;No redness;No swelling 4/1/2017  3:05 AM   Line Status Infusing 4/1/2017  3:05 AM   Dressing Status Clean;Dry;Intact 4/1/2017  3:05 AM   Dressing Change Due 04/02/17 3/31/2017  7:41 AM   Site Change Due 04/02/17 3/30/2017  7:05 PM   Reason Not Rotated Not due 4/1/2017  3:05 AM   Number of days:3            Urethral Catheter 03/30/17 0301 (Active)  "  Site Assessment Clean;Intact 4/1/2017  3:05 AM   Collection Container Urimeter 4/1/2017  3:05 AM   Securement Method secured to top of thigh w/ adhesive device 4/1/2017  3:05 AM   Catheter Care Performed yes 4/1/2017  3:05 AM   Reason for Continuing Urinary Catheterization Critically ill in ICU requiring intensive monitoring 4/1/2017  3:05 AM   CAUTI Prevention Bundle StatLock in place w 1" slack;Intact seal between catheter & drainage tubing;Drainage bag off the floor;Green sheeting clip in use;No dependent loops or kinks;Drainage bag not overfilled (<2/3 full);Drainage bag below bladder 3/31/2017  7:05 PM   Output (mL) 700 mL 4/1/2017  6:00 AM   Number of days:2       Laboratory (Last 24H):  CBC:    Recent Labs  Lab 04/01/17  0358   WBC 9.31   HGB 14.4   HCT 42.6        CMP:    Recent Labs  Lab 04/01/17  0358   CALCIUM 8.1*   ALBUMIN 3.2*   PROT 5.7*      K 4.0   CO2 19*   *   BUN 17   CREATININE 0.8   ALKPHOS 85   ALT 21   AST 24   BILITOT 0.9     BMP:   Recent Labs  Lab 04/01/17  0358   GLU 54*      K 4.0   *   CO2 19*   BUN 17   CREATININE 0.8   CALCIUM 8.1*     Coagulation:   Recent Labs  Lab 03/29/17  2145   INR 1.0   APTT 26.9     Labs within the past 24 hours have been reviewed.    Chest X-Ray: cardiomegaly    Diagnostic Results:  MRI: Reviewed  Echo: Reviewed    ASSESSMENT/PLAN:     Preventive Measures: Stress Ulcer: continue prophyllaxis, DVT: continue prophyllaxis, Reposition: Repostion and inhibitory positioning, Physical Therapy: continue active PT/OT/ST        Plan:  Neuro: Agree with PEG placement.  She has markedly increased motor tone on the left which will prevent safe swallow.  She will need resting hand splint on the left.  She is candidate for inpatient rehab.    Counseling/Consultation:Discussed with ICU    Critical Care Time greater than: 45 Minutes  "

## 2017-04-01 NOTE — PT/OT/SLP PROGRESS
Speech Language Pathology  Treatment    Christine Mcgill   MRN: [id number]   Admitting Diagnosis: Acute CVA (cerebrovascular accident)    Diet recommendations: Solid Diet Level: NPO  Liquid Diet Level: NPO     SLP Treatment Date: 17  Speech Start Time: 944     Speech Stop Time: 959     Speech Total (min): 15 min       TREATMENT BILLABLE MINUTES:  Speech Therapy Individual 7 and Treatment Swallowing Dysfunction 8    Has the patient been evaluated by SLP for swallowing? : Yes  Keep patient NPO?: Yes   General Precautions: Standard, aspiration, fall  Current Respiratory Status: nasal cannula       Subjective:  Pt with varying levels of alertness and emotional labial like symptoms.      Pain Ratin/10              Pain Rating Post-Intervention: 0/10    Objective:   Patient found with: telemetry, peripheral IV, alvarez catheter, blood pressure cuff, oxygen, pulse ox (continuous)     Oral care provided for hydration, sensation, and generation of spontaneous swallow reflex. Pt participated in lingual and labial OMEs with mod a. Pt educated on dysarthria strategies for increase intelligibility of speech. She required min a for naming objects within sentences.    Assessment:  Christine Mcgill is a 61 y.o. female with a medical diagnosis of Acute CVA (cerebrovascular accident) and presents with moderate dysarthria and severe dysphagia.      Discharge recommendations: Discharge Facility/Level Of Care Needs: rehabilitation facility     Goals:   SLP Goals        Problem: SLP Goal    Goal Priority Disciplines Outcome   SLP Goal     SLP Ongoing (interventions implemented as appropriate)   Description:  1. Ongoing swallow assessment with MBSS when warranted to establish safest diet  2. Pt will complete expressive speech tasks with mod cueing for word finding  3. Pt will attend to tasks for 2 minutes with min cueing  4. Pt will complete oral and pharyngeal ex's with min cueing                 Plan:   Patient to be seen Therapy  Frequency: 5 x/week   Plan of Care expires: 04/06/17  Plan of Care reviewed with: patient  SLP Follow-up?: Yes              Davina Corrigan CCC-SLP  04/01/2017

## 2017-04-01 NOTE — PLAN OF CARE
Problem: SLP Goal  Goal: SLP Goal  1. Ongoing swallow assessment with MBSS when warranted to establish safest diet  2. Pt will complete expressive speech tasks with mod cueing for word finding  3. Pt will attend to tasks for 2 minutes with min cueing  4. Pt will complete oral and pharyngeal exs with min cueing     Pt required mod a for labial and lingual OME's. She participate in naming objects within sentences with min a.

## 2017-04-01 NOTE — ASSESSMENT & PLAN NOTE
- - With expressive aphasia  - Hold Aspirin pending PEG placement on Monday, Statin  - MRI Brain, MRA Head/Neck + For CVA  - Rehab consult  - Neurology consult  - NPO - PEG 4/3   - PT/OT/ST  - Allow permissive hypertension

## 2017-04-01 NOTE — PT/OT/SLP PROGRESS
Occupational Therapy  Treatment    Christine Mcgill   MRN: [id number]   Admitting Diagnosis: Acute CVA (cerebrovascular accident)    OT Date of Treatment: 17   OT Start Time: 810  OT Stop Time: 833  OT Total Time (min): 23 min    Billable Minutes:  Therapeutic Activity  x 10 min and Therapeutic Exercise  x 13 min    General Precautions: Standard, fall  Orthopedic Precautions: N/A  Braces:           Subjective:  Communicated with pt, daughter, and nurse - Lino prior to session.      Pain Ratin/10  Location - Side: Left     Location: leg  Pain Addressed: Pre-medicate for activity, Reposition, Distraction       Objective:  Patient found with: telemetry, peripheral IV, alvarez catheter, blood pressure cuff, oxygen, pulse ox (continuous)     Functional Mobility:  Bed Mobility:  Rolling/Turning to Left: Maximum assistance  Rolling/Turning Right: Maximum assistance  Scooting/Bridging: Maximum Assistance, With assist of 2  Supine to Sit: Maximum Assistance    Transfers:   Sit <> Stand Assistance: Maximum Assistance (assist of 2)  Sit <> Stand Assistive Device: No Assistive Device  Bed <> Chair Technique: Squat Pivot  Bed <> Chair Assistive Device: No Assistive Device    Functional Ambulation:     Activities of Daily Living:     Feeding adaptive equipment:      UE adaptive equipment:      LE adaptive equipment:                     Bathing adaptive equipment:     Balance:   Static Sit: POOR+: Needs MINIMAL assist to maintain  Dynamic Sit: POOR: N/A  Static Stand: 0: Needs MAXIMAL assist to maintain   Dynamic stand:     Therapeutic Activities and Exercises:  Pt seen in room, transfers with max A x 2 - squat pivot techniques with HHA, performed PROM ex to LUE to all available planes in pain free ranges, manual massage to LUE to increase blood flow. Pt tolerated tx well, cooperative, reports less pain to left leg in sitting then in supine. Pt was tearful and appears distress with increased HR noted in supine - YAIR Huynh  consulted. HR elevated in sitting 212/133. Lino was consulted and ok for therapy to have pt up in chair. Pt appears in no distress sitting and voiced no dizziness.     AM-PAC 6 CLICK ADL   How much help from another person does this patient currently need?   1 = Unable, Total/Dependent Assistance  2 = A lot, Maximum/Moderate Assistance  3 = A little, Minimum/Contact Guard/Supervision  4 = None, Modified [place]/Independent          AM-PAC Raw Score CMS G-Code Modifier Level of Impairment Assistance   6 % Total / Unable   7 - 9 CM 80 - 100% Maximal Assist   10 - 14 CL 60 - 80% Moderate Assist   15 - 19 CK 40 - 60% Moderate Assist   20 - 22 CJ 20 - 40% Minimal Assist   23 CI 1-20% SBA / CGA   24 CH 0% Independent/ Mod I     Patient left up in chair with all lines intact, call button in reach, Lino - RN,  notified and daughter present    ASSESSMENT:  Christine Mcgill is a 61 y.o. female with a medical diagnosis of Acute CVA (cerebrovascular accident) and presents with impaired self care skills and fx mobility.    Rehab identified problem list/impairments: Rehab identified problem list/impairments: weakness, impaired endurance, impaired self care skills, impaired sensation, impaired functional mobilty, gait instability, impaired balance, decreased coordination, impaired cognition, decreased upper extremity function, decreased lower extremity function, decreased safety awareness, pain, decreased ROM, abnormal tone    Rehab potential is good.    Activity tolerance: Good    Discharge recommendations: Discharge Facility/Level Of Care Needs: rehabilitation facility     Barriers to discharge: Barriers to Discharge: Decreased caregiver support    Equipment recommendations: wheelchair, walker, rolling, bedside commode, bath bench     GOALS:   Occupational Therapy Goals        Problem: Occupational Therapy Goal    Goal Priority Disciplines Outcome Interventions   Occupational Therapy Goal     OT, PT/OT Ongoing  (interventions implemented as appropriate)    Description:  ot goals to be met by 4-7-17  1. Pt will tolerate 1 set x 10 reps r ue arom and l ue aarom exercise  2. Min a with ue dressing  3. Min a with le dressing                 Plan:  Patient to be seen 3 x/week to address the above listed problems via self-care/home management, therapeutic exercises, therapeutic activities, neuromuscular re-education  Plan of Care expires: 04/06/17  Plan of Care reviewed with: patient, daughter         Alex Carney, OT  04/01/2017

## 2017-04-01 NOTE — PROGRESS NOTES
Problem: Patient Care Overview  Goal: Plan of Care Review  Outcome: Ongoing (interventions implemented as appropriate)  Pt is without falls or injury this shift. CVA with left side affected. Complains of pain to LLE, with muscle cramping, Morphine given per MAR with some relief. Family at bedside. NPO due to failed swallowing studies with plans for possible PEG on Monday. Awaiting approval for rehab placement. Systolic -206 with orders for coverage after systolic over 220. Continues to be tearful/emotional. Bed low, bed alarm on and call light within reach.

## 2017-04-01 NOTE — PT/OT/SLP PROGRESS
"Physical Therapy  Treatment    Christine Mcgill   MRN: [id number]   Admitting Diagnosis: Acute CVA (cerebrovascular accident)    PT Received On: 04/01/17  PT Start Time: 0810     PT Stop Time: 0830    PT Total Time (min): 20 min       Billable Minutes:  Therapeutic Exercise 20    Treatment Type: Treatment  PT/PTA: PT             General Precautions: Standard, fall  Orthopedic Precautions: N/A   Braces:           Subjective:  Communicated with Julio C and Lino prior to session.      Pain Rating: 10/10  Location - Side: Left     Location: leg  Pain Addressed: Pre-medicate for activity, Reposition, Distraction  Pain Rating Post-Intervention:  (noted to have less distress in sitting)    Objective:   Patient found with: peripheral IV, blood pressure cuff, oxygen, alvarez catheter, telemetry    Functional Mobility:  Bed Mobility:   Rolling/Turning to Left: Moderate assistance  Supine to Sit: Moderate Assistance    Transfers:  Sit <> Stand Assistance: Maximum Assistance  Sit <> Stand Assistive Device: No Assistive Device  Bed <> Chair Technique: Squat Pivot  Bed <> Chair Assistance: Maximum Assistance (max of 2; max A to block left knee through transition)  Bed <> Chair Assistive Device: No Assistive Device    Gait:        Stairs:      Balance:   Static Sit: FAIR-: Maintains without assist but inconsistent , vc for upright posture  Dynamic Sit: FAIR: Cannot move trunk without losing balance  Static Stand: 0: Needs MAXIMAL assist to maintain   Dynamic stand: POOR: N/A     Therapeutic Activities and Exercises:  Patient in notable distress and pain upon entering room, /150.  Nursing state "normal" for patient.  Attempted left ankle range of motion, pt pull LE into flexion.  Transition to sitting EOB; perform passive ankle DF/PF, knee extension/flexion, hip flex/ext and IR/ER.  Encouraged patient to assist with movement, no active initiation noted.  /133 HR 87 in sitting, patient complain of pain with pressure of cuff.  " Patient able to answer questions appropriately during session.     AM-PAC 6 CLICK MOBILITY  How much help from another person does this patient currently need?   1 = Unable, Total/Dependent Assistance  2 = A lot, Maximum/Moderate Assistance  3 = A little, Minimum/Contact Guard/Supervision  4 = None, Modified [place]/Independent         AM-PAC Raw Score CMS G-Code Modifier Level of Impairment Assistance   6 % Total / Unable   7 - 9 CM 80 - 100% Maximal Assist   10 - 14 CL 60 - 80% Moderate Assist   15 - 19 CK 40 - 60% Moderate Assist   20 - 22 CJ 20 - 40% Minimal Assist   23 CI 1-20% SBA / CGA   24 CH 0% Independent/ Mod I     Patient left up in chair with all lines intact and call button in reach.    Assessment:  Christine Mcgill is a 61 y.o. female with a medical diagnosis of Acute CVA (cerebrovascular accident) and presents with decreased functional mobility and left hemiplegia.    Rehab identified problem list/impairments:      Rehab potential is good.    Activity tolerance: Good    Discharge recommendations: Discharge Facility/Level Of Care Needs: rehabilitation facility     Barriers to discharge:      Equipment recommendations:       GOALS:   Physical Therapy Goals        Problem: Physical Therapy Goal    Goal Priority Disciplines Outcome Goal Variances Interventions   Physical Therapy Goal     PT/OT, PT Ongoing (interventions implemented as appropriate)     Description:  PT WILL BE SEEN FOR P.T. FOR A MIN OF 5 OUT OF 7 DAYS A WEEK  LT17  1. PT WILL COMPLETE BED MOBILITY WITH MOD A.   2. PT WILL STAND WITH RW AND MOD A FOR T/F AND PRE-GT  3. PT WILL COMPLETE B LE TE X 10 REPS FOR ROM AND STRENGTHENING.                PLAN:    Patient to be seen  (pt will be seen a min of 5-7 days as tolerated)  to address the above listed problems via gait training, therapeutic activities, therapeutic exercises  Plan of Care expires: 17  Plan of Care reviewed with: patient, daughter         Luz Marina  Juan Ramon, PT  04/01/2017

## 2017-04-01 NOTE — PROGRESS NOTES
[facility] Hospital Medicine  Progress Note    Patient Name: Christine Mcgill  MRN: [id number]  Patient Class: IP- Inpatient   Admission Date: 3/29/2017  Length of Stay: 3 days  Attending Physician: Alvin Joseph MD  Primary Care Provider: Primary Doctor No        Subjective:     Principal Problem:Acute CVA (cerebrovascular accident)    HPI:  Ms. Mcgill is a 62 y/o  female with h/o HTN, hyperlipidemia, presents to the ED with aphasia. She was last known normal at 9 AM, slept most of the day, was noted by family members around 8:30 PM that patient unable to talk. In the ED, CT head was negative for hemorrhage or mass. Tele stroke was done, patient not a TPA candidate due to outside of therapeutic window. Patient is spontaneously moving al extremities, she is able to comprehend well but unable to express.    Hospital Course:  3/30/17 - Patient 62 yo female admitted for acute CVA confirmed by MRI. Patient with ongoing deficits, evaluated by Speech recommended for NPO. Neurology on consult.  3/31/17 - Patient Swallow study confirming aspiration - NPO recommended. Case discussed with Patient and family - PEG tube recommended. Surgery Advised the patient / Family of the risks and benefits of PEG placement. All acknowledged and accepted plan for placement . Patient having received ASA so the procedure is scheduled for Monday 4/3. Patient further plan for rehab on acceptance. Improving Expression and Movement.  4/1/17 - Patient improving steadily. Speech more intelligible. No complaint beyond her CVA effects.      Interval History: Improving Speech. L Side weakness / abnormal coordination remains. Less Emotional this morning    Review of Systems   Constitutional: Positive for activity change and fatigue. Negative for unexpected weight change.   HENT: Negative.  Negative for trouble swallowing.    Eyes: Negative.    Respiratory: Negative.  Negative for cough, shortness of breath and wheezing.     Cardiovascular: Negative.  Negative for chest pain.   Gastrointestinal: Negative.    Endocrine: Negative.    Genitourinary: Negative.    Musculoskeletal: Negative.    Skin: Negative.    Allergic/Immunologic: Negative.    Neurological: Positive for speech difficulty and weakness. Negative for dizziness.   Hematological: Negative.    Psychiatric/Behavioral: Negative.    All other systems reviewed and are negative.    Objective:     Vital Signs (Most Recent):  Temp: 98.1 °F (36.7 °C) (04/01/17 0700)  Pulse: 78 (04/01/17 0904)  Resp: (!) 22 (04/01/17 0904)  BP: (!) 206/91 (04/01/17 0904)  SpO2: 95 % (04/01/17 0904) Vital Signs (24h Range):  Temp:  [97.8 °F (36.6 °C)-98.4 °F (36.9 °C)] 98.1 °F (36.7 °C)  Pulse:  [] 78  Resp:  [17-26] 22  SpO2:  [93 %-100 %] 95 %  BP: (155-213)/() 206/91     Weight: 87 kg (191 lb 12.8 oz)  Body mass index is 37.46 kg/(m^2).    Intake/Output Summary (Last 24 hours) at 04/01/17 0926  Last data filed at 04/01/17 0700   Gross per 24 hour   Intake             2575 ml   Output              885 ml   Net             1690 ml      Physical Exam   Constitutional: She is oriented to person, place, and time. She appears well-developed and well-nourished.   HENT:   Head: Normocephalic and atraumatic.   Eyes: EOM are normal. Pupils are equal, round, and reactive to light.   Neck: Normal range of motion. Neck supple. No JVD present.   Cardiovascular: Normal rate, regular rhythm, normal heart sounds and intact distal pulses.    No murmur heard.  Pulmonary/Chest: No respiratory distress. She has wheezes.   + Sparse Wheeze B/L Lungs   Abdominal: Soft. Bowel sounds are normal. She exhibits no distension.   Musculoskeletal: Normal range of motion. She exhibits no edema or tenderness.   Neurological: She is alert and oriented to person, place, and time. She has normal reflexes. No cranial nerve deficit. She exhibits abnormal muscle tone. Coordination abnormal.   Skin: Skin is warm and dry. No  erythema.   Nursing note and vitals reviewed.      Significant Labs:   BMP:   Recent Labs  Lab 04/01/17 0358   GLU 54*      K 4.0   *   CO2 19*   BUN 17   CREATININE 0.8   CALCIUM 8.1*     CBC:   Recent Labs  Lab 03/31/17 0326 04/01/17 0358   WBC 11.41 9.31   HGB 15.2 14.4   HCT 44.1 42.6    174     CMP:   Recent Labs  Lab 03/31/17 0326 04/01/17 0358    141   K 4.1 4.0    111*   CO2 22* 19*   GLU 78 54*   BUN 24* 17   CREATININE 0.9 0.8   CALCIUM 8.7 8.1*   PROT 6.0 5.7*   ALBUMIN 3.4* 3.2*   BILITOT 0.8 0.9   ALKPHOS 95 85   AST 20 24   ALT 16 21   ANIONGAP 10 11   EGFRNONAA >60 >60     Cardiac Markers: No results for input(s): CKMB, MYOGLOBIN, BNP, TROPISTAT in the last 48 hours.  Troponin: No results for input(s): TROPONINI in the last 48 hours.  All pertinent labs within the past 24 hours have been reviewed.    Significant Imaging: I have reviewed all pertinent imaging results/findings within the past 24 hours.    Assessment/Plan:      * Acute CVA (cerebrovascular accident)  - - With expressive aphasia  - Hold Aspirin pending PEG placement on Monday, Statin  - MRI Brain, MRA Head/Neck + For CVA  - Rehab consult  - Neurology consult  - NPO - PEG 4/3   - PT/OT/ST  - Allow permissive hypertension    Expressive aphasia  Due to Acute CVA  Neuro  Rehab  ST       Essential hypertension  - Allow permissive hypertension  - Intervene if SBP > 220 and DBP > 120      Tobacco abuse  Counseled on cessation      COPD (chronic obstructive pulmonary disease)  NEBS  O2  Smoking Cessation      VTE Risk Mitigation         Ordered     enoxaparin injection 40 mg  Daily     Route:  Subcutaneous        03/30/17 0013     Medium Risk of VTE  Once      03/30/17 0013     Place sequential compression device  Until discontinued      03/30/17 0013     Reason for No Pharmacological VTE Prophylaxis  Once      03/30/17 0013          Alvin Joseph MD  Department of Hospital Medicine   [facility] [place]

## 2017-04-01 NOTE — PLAN OF CARE
Problem: Physical Therapy Goal  Goal: Physical Therapy Goal  PT WILL BE SEEN FOR P.T. FOR A MIN OF 5 OUT OF 7 DAYS A WEEK  LT17  1. PT WILL COMPLETE BED MOBILITY WITH MOD A.   2. PT WILL STAND WITH RW AND MOD A FOR T/F AND PRE-GT  3. PT WILL COMPLETE B LE TE X 10 REPS FOR ROM AND STRENGTHENING.   Outcome: Ongoing (interventions implemented as appropriate)  Mod A supine to sit with trunk.  T/f bed to chair max A of 2 squat pivot.  Progressing toward goals; limited tolerance of left LE movement secondary to pain.

## 2017-04-02 LAB
ALBUMIN SERPL BCP-MCNC: 3.3 G/DL
ALP SERPL-CCNC: 87 U/L
ALT SERPL W/O P-5'-P-CCNC: 21 U/L
ANION GAP SERPL CALC-SCNC: 14 MMOL/L
AST SERPL-CCNC: 26 U/L
BASOPHILS # BLD AUTO: 0.05 K/UL
BASOPHILS NFR BLD: 0.6 %
BILIRUB SERPL-MCNC: 1 MG/DL
BUN SERPL-MCNC: 9 MG/DL
CALCIUM SERPL-MCNC: 8.4 MG/DL
CHLORIDE SERPL-SCNC: 107 MMOL/L
CO2 SERPL-SCNC: 17 MMOL/L
CREAT SERPL-MCNC: 0.7 MG/DL
DIFFERENTIAL METHOD: ABNORMAL
EOSINOPHIL # BLD AUTO: 0.2 K/UL
EOSINOPHIL NFR BLD: 2.3 %
ERYTHROCYTE [DISTWIDTH] IN BLOOD BY AUTOMATED COUNT: 13.2 %
EST. GFR  (AFRICAN AMERICAN): >60 ML/MIN/1.73 M^2
EST. GFR  (NON AFRICAN AMERICAN): >60 ML/MIN/1.73 M^2
GLUCOSE SERPL-MCNC: 53 MG/DL
HCT VFR BLD AUTO: 45 %
HGB BLD-MCNC: 15.4 G/DL
LYMPHOCYTES # BLD AUTO: 2.1 K/UL
LYMPHOCYTES NFR BLD: 23.8 %
MCH RBC QN AUTO: 30.7 PG
MCHC RBC AUTO-ENTMCNC: 34.2 %
MCV RBC AUTO: 90 FL
MONOCYTES # BLD AUTO: 1.2 K/UL
MONOCYTES NFR BLD: 13.2 %
NEUTROPHILS # BLD AUTO: 5.4 K/UL
NEUTROPHILS NFR BLD: 60.1 %
PLATELET # BLD AUTO: 174 K/UL
PMV BLD AUTO: 10.5 FL
POTASSIUM SERPL-SCNC: 4.2 MMOL/L
PROT SERPL-MCNC: 6 G/DL
RBC # BLD AUTO: 5.02 M/UL
SODIUM SERPL-SCNC: 138 MMOL/L
WBC # BLD AUTO: 9 K/UL

## 2017-04-02 PROCEDURE — 63600175 PHARM REV CODE 636 W HCPCS: Performed by: INTERNAL MEDICINE

## 2017-04-02 PROCEDURE — 25000003 PHARM REV CODE 250: Performed by: NURSE PRACTITIONER

## 2017-04-02 PROCEDURE — 36415 COLL VENOUS BLD VENIPUNCTURE: CPT

## 2017-04-02 PROCEDURE — 97535 SELF CARE MNGMENT TRAINING: CPT

## 2017-04-02 PROCEDURE — 92507 TX SP LANG VOICE COMM INDIV: CPT

## 2017-04-02 PROCEDURE — 85025 COMPLETE CBC W/AUTO DIFF WBC: CPT

## 2017-04-02 PROCEDURE — 99231 SBSQ HOSP IP/OBS SF/LOW 25: CPT | Mod: ,,, | Performed by: SURGERY

## 2017-04-02 PROCEDURE — C9113 INJ PANTOPRAZOLE SODIUM, VIA: HCPCS | Performed by: EMERGENCY MEDICINE

## 2017-04-02 PROCEDURE — 63600175 PHARM REV CODE 636 W HCPCS: Performed by: NURSE PRACTITIONER

## 2017-04-02 PROCEDURE — 80053 COMPREHEN METABOLIC PANEL: CPT

## 2017-04-02 PROCEDURE — 21400001 HC TELEMETRY ROOM

## 2017-04-02 PROCEDURE — 63600175 PHARM REV CODE 636 W HCPCS: Performed by: SURGERY

## 2017-04-02 PROCEDURE — 94640 AIRWAY INHALATION TREATMENT: CPT

## 2017-04-02 PROCEDURE — 25000003 PHARM REV CODE 250: Performed by: INTERNAL MEDICINE

## 2017-04-02 PROCEDURE — 92526 ORAL FUNCTION THERAPY: CPT

## 2017-04-02 PROCEDURE — 25000242 PHARM REV CODE 250 ALT 637 W/ HCPCS: Performed by: INTERNAL MEDICINE

## 2017-04-02 PROCEDURE — 97530 THERAPEUTIC ACTIVITIES: CPT

## 2017-04-02 PROCEDURE — 63600175 PHARM REV CODE 636 W HCPCS: Performed by: EMERGENCY MEDICINE

## 2017-04-02 PROCEDURE — 27000221 HC OXYGEN, UP TO 24 HOURS

## 2017-04-02 RX ORDER — MORPHINE SULFATE 2 MG/ML
2 INJECTION, SOLUTION INTRAMUSCULAR; INTRAVENOUS EVERY 6 HOURS PRN
Status: DISCONTINUED | OUTPATIENT
Start: 2017-04-02 | End: 2017-04-05 | Stop reason: HOSPADM

## 2017-04-02 RX ORDER — CEFAZOLIN SODIUM 2 G/50ML
2 SOLUTION INTRAVENOUS
Status: DISCONTINUED | OUTPATIENT
Start: 2017-04-02 | End: 2017-04-05 | Stop reason: HOSPADM

## 2017-04-02 RX ORDER — DEXTROSE MONOHYDRATE AND SODIUM CHLORIDE 5; .9 G/100ML; G/100ML
INJECTION, SOLUTION INTRAVENOUS CONTINUOUS
Status: DISCONTINUED | OUTPATIENT
Start: 2017-04-02 | End: 2017-04-04

## 2017-04-02 RX ADMIN — IPRATROPIUM BROMIDE AND ALBUTEROL SULFATE 3 ML: .5; 3 SOLUTION RESPIRATORY (INHALATION) at 07:04

## 2017-04-02 RX ADMIN — IPRATROPIUM BROMIDE AND ALBUTEROL SULFATE 3 ML: .5; 3 SOLUTION RESPIRATORY (INHALATION) at 12:04

## 2017-04-02 RX ADMIN — ENOXAPARIN SODIUM 40 MG: 100 INJECTION SUBCUTANEOUS at 04:04

## 2017-04-02 RX ADMIN — BUDESONIDE 0.5 MG: 0.5 SUSPENSION RESPIRATORY (INHALATION) at 07:04

## 2017-04-02 RX ADMIN — ARFORMOTEROL TARTRATE 15 MCG: 15 SOLUTION RESPIRATORY (INHALATION) at 07:04

## 2017-04-02 RX ADMIN — IPRATROPIUM BROMIDE AND ALBUTEROL SULFATE 3 ML: .5; 3 SOLUTION RESPIRATORY (INHALATION) at 04:04

## 2017-04-02 RX ADMIN — IPRATROPIUM BROMIDE AND ALBUTEROL SULFATE 3 ML: .5; 3 SOLUTION RESPIRATORY (INHALATION) at 11:04

## 2017-04-02 RX ADMIN — SODIUM CHLORIDE: 0.9 INJECTION, SOLUTION INTRAVENOUS at 04:04

## 2017-04-02 RX ADMIN — PANTOPRAZOLE SODIUM 40 MG: 40 INJECTION, POWDER, FOR SOLUTION INTRAVENOUS at 08:04

## 2017-04-02 RX ADMIN — IPRATROPIUM BROMIDE AND ALBUTEROL SULFATE 3 ML: .5; 3 SOLUTION RESPIRATORY (INHALATION) at 03:04

## 2017-04-02 RX ADMIN — DEXTROSE AND SODIUM CHLORIDE: 5; .9 INJECTION, SOLUTION INTRAVENOUS at 11:04

## 2017-04-02 RX ADMIN — MORPHINE SULFATE 2 MG: 2 INJECTION, SOLUTION INTRAMUSCULAR; INTRAVENOUS at 06:04

## 2017-04-02 RX ADMIN — MORPHINE SULFATE 2 MG: 2 INJECTION, SOLUTION INTRAMUSCULAR; INTRAVENOUS at 08:04

## 2017-04-02 NOTE — PT/OT/SLP PROGRESS
Physical Therapy  Treatment    Christine Mcgill   MRN: [id number]   Admitting Diagnosis: Acute CVA (cerebrovascular accident)    PT Received On: 04/02/17  PT Start Time: 0825     PT Stop Time: 0900    PT Total Time (min): 35 min       Billable Minutes:  Therapeutic Activity 35    Treatment Type: Treatment  PT/PTA: PT             General Precautions: Standard, fall  Orthopedic Precautions: N/A   Braces:           Subjective:  Communicated with Julio C and Tonia prior to session.      Pain Rating: 10/10  Location - Side: Left     Location: other (see comments) (leg and arm)  Pain Addressed: Pre-medicate for activity, Reposition  Pain Rating Post-Intervention:  (decrease with repositioning)    Objective:   Patient found with: telemetry, peripheral IV, oxygen    Functional Mobility:  Bed Mobility:   Supine to Sit: Moderate Assistance  Sit to Supine: Moderate Assistance    Transfers:  Sit <> Stand Assistance: Maximum Assistance (max A of 2 to control left hand, bilateral knee and hip exentension)  Sit <> Stand Assistive Device: Rolling Walker  Bed <> Chair Technique: Squat Pivot  Bed <> Chair Assistance: Maximum Assistance  Bed <> Chair Assistive Device: No Assistive Device    Gait:   Gait Distance:  (unable to stabilize single leg to advance)    Stairs:      Balance:   Static Sit: FAIR+: Able to take MINIMAL challenges from all directions  Dynamic Sit: FAIR: Cannot move trunk without losing balance  Static Stand: 0: Needs MAXIMAL assist to maintain   Dynamic stand: 0: N/A     Therapeutic Activities and Exercises:  AAROM in supine prior to EOB including hip/knee flexion, hip abduction/adduction and ankle pumps x10.  Sit to stand x2 max A of 2 to control left hand on RW, and bilateral hip and knee extension.  Weight shift R=L, max A to maintain hip and knee extension bilaterally, right knee maintained in knee flexion, did not self correct with verbal cueing only.  Patient fatigued after 2 trials of standing; transferred to chair  with assist of 1.  Passive WB through left LE in sitting; active assist knee extension x10 to increase hamstring length/tightness.  Pt up in chair, request to return to bed after 10 minutes but encouraged to stay up at least 30 minutes.  Remained up x40:00, returned to supine position.  Pillow placed between knees to decrease adduction postioning.    AM-PAC 6 CLICK MOBILITY  How much help from another person does this patient currently need?   1 = Unable, Total/Dependent Assistance  2 = A lot, Maximum/Moderate Assistance  3 = A little, Minimum/Contact Guard/Supervision  4 = None, Modified [place]/Independent         AM-PAC Raw Score CMS G-Code Modifier Level of Impairment Assistance   6 % Total / Unable   7 - 9 CM 80 - 100% Maximal Assist   10 - 14 CL 60 - 80% Moderate Assist   15 - 19 CK 40 - 60% Moderate Assist   20 - 22 CJ 20 - 40% Minimal Assist   23 CI 1-20% SBA / CGA   24 CH 0% Independent/ Mod I     Patient left supine with all lines intact, call button in reach and family present.    Assessment:  Christine Mcgill is a 61 y.o. female with a medical diagnosis of Acute CVA (cerebrovascular accident) and presents with left hemiplegia, decreased functional mobility and decrease stance stabilization.    Rehab identified problem list/impairments: Rehab identified problem list/impairments: weakness, impaired functional mobilty, decreased coordination, impaired balance, gait instability, decreased lower extremity function, pain    Rehab potential is good.    Activity tolerance: Good    Discharge recommendations: Discharge Facility/Level Of Care Needs: rehabilitation facility     Barriers to discharge:      Equipment recommendations: Equipment Needed After Discharge:  (to be determined as patient progresses with rehab)     GOALS:   Physical Therapy Goals        Problem: Physical Therapy Goal    Goal Priority Disciplines Outcome Goal Variances Interventions   Physical Therapy Goal     PT/OT, PT Ongoing  (interventions implemented as appropriate)     Description:  PT WILL BE SEEN FOR P.T. FOR A MIN OF 5 OUT OF 7 DAYS A WEEK  LT17  1. PT WILL COMPLETE BED MOBILITY WITH MOD A.   2. PT WILL STAND WITH RW AND MOD A FOR T/F AND PRE-GT  3. PT WILL COMPLETE B LE TE X 10 REPS FOR ROM AND STRENGTHENING.                PLAN:    Patient to be seen  (pt will be seen a min of 5-7 days as tolerated)  to address the above listed problems via gait training, therapeutic activities, therapeutic exercises  Plan of Care expires: 17  Plan of Care reviewed with: patient, family         Luz Marina Juan Ramon, PT  2017

## 2017-04-02 NOTE — PT/OT/SLP PROGRESS
Speech Language Pathology  Treatment    Christine Mcgill   MRN: [id number]   Admitting Diagnosis: Acute CVA (cerebrovascular accident)    Diet recommendations: Solid Diet Level: NPO  Liquid Diet Level: NPO     SLP Treatment Date: 17  Speech Start Time: 949     Speech Stop Time: 101     Speech Total (min): 23 min       TREATMENT BILLABLE MINUTES:  Speech Therapy Individual 10 and Treatment Swallowing Dysfunction 11    Has the patient been evaluated by SLP for swallowing? : Yes  Keep patient NPO?: Yes   General Precautions: Standard, fall  Current Respiratory Status: nasal cannula       Subjective:  Pt alert and seen at bedside.    Pain Ratin/10              Pain Rating Post-Intervention: 0/10    Objective:   Patient found with: oxygen, telemetry, peripheral IV    Pt completed expressive language tasks such as naming within sentences and responsive naming tasks with mod a. Pt with increase intelligibility of speech. Pt educated on dysarthria strategies. Tactile and gustatory stimulation provided for timing, coordination, and generation of spontaneous swallow reflex. Pt able to produce 5/10 swallows with mod cues.   Assessment:  Christine Mcgill is a 61 y.o. female with a medical diagnosis of Acute CVA (cerebrovascular accident) and presents with dysarthria and dysphagia.    Discharge recommendations: Discharge Facility/Level Of Care Needs: rehabilitation facility     Goals:   SLP Goals        Problem: SLP Goal    Goal Priority Disciplines Outcome   SLP Goal     SLP Ongoing (interventions implemented as appropriate)   Description:  1. Ongoing swallow assessment with MBSS when warranted to establish safest diet  2. Pt will complete expressive speech tasks with mod cueing for word finding  3. Pt will attend to tasks for 2 minutes with min cueing  4. Pt will complete oral and pharyngeal ex's with min cueing                 Plan:   Patient to be seen Therapy Frequency: 5 x/week   Plan of Care expires: 17  Plan of  Care reviewed with: patient, family  SLP Follow-up?: Yes              Davina Corrigan CCC-SLP  04/02/2017

## 2017-04-02 NOTE — SUBJECTIVE & OBJECTIVE
Interval History: Improving Clinically    Review of Systems  Objective:     Vital Signs (Most Recent):  Temp: 98.5 °F (36.9 °C) (04/02/17 1200)  Pulse: 80 (04/02/17 1200)  Resp: (!) 2 (04/02/17 1200)  BP: (!) 171/100 (04/02/17 0825)  SpO2: 100 % (04/02/17 1200) Vital Signs (24h Range):  Temp:  [97.9 °F (36.6 °C)-98.5 °F (36.9 °C)] 98.5 °F (36.9 °C)  Pulse:  [67-88] 80  Resp:  [2-28] 2  SpO2:  [96 %-100 %] 100 %  BP: (158-184)/() 171/100     Weight: 87 kg (191 lb 12.8 oz)  Body mass index is 37.46 kg/(m^2).    Intake/Output Summary (Last 24 hours) at 04/02/17 1407  Last data filed at 04/02/17 0600   Gross per 24 hour   Intake             3000 ml   Output             1180 ml   Net             1820 ml      Physical Exam    Significant Labs:   BMP:   Recent Labs  Lab 04/02/17 0615   GLU 53*      K 4.2      CO2 17*   BUN 9   CREATININE 0.7   CALCIUM 8.4*     CBC:   Recent Labs  Lab 04/01/17  0358 04/02/17 0615   WBC 9.31 9.00   HGB 14.4 15.4   HCT 42.6 45.0    174     CMP:   Recent Labs  Lab 04/01/17  0358 04/02/17 0615    138   K 4.0 4.2   * 107   CO2 19* 17*   GLU 54* 53*   BUN 17 9   CREATININE 0.8 0.7   CALCIUM 8.1* 8.4*   PROT 5.7* 6.0   ALBUMIN 3.2* 3.3*   BILITOT 0.9 1.0   ALKPHOS 85 87   AST 24 26   ALT 21 21   ANIONGAP 11 14   EGFRNONAA >60 >60     Cardiac Markers: No results for input(s): CKMB, MYOGLOBIN, BNP, TROPISTAT in the last 48 hours.  Troponin: No results for input(s): TROPONINI in the last 48 hours.  All pertinent labs within the past 24 hours have been reviewed.    Significant Imaging: I have reviewed all pertinent imaging results/findings within the past 24 hours.

## 2017-04-02 NOTE — PROGRESS NOTES
[facility] Hospital Medicine  Progress Note    Patient Name: Christine Mcgill  MRN: [id number]  Patient Class: IP- Inpatient   Admission Date: 3/29/2017  Length of Stay: 4 days  Attending Physician: Alvin Joseph MD  Primary Care Provider: Primary Doctor No        Subjective:     Principal Problem:Acute CVA (cerebrovascular accident)    HPI:  Ms. Mcgill is a 60 y/o  female with h/o HTN, hyperlipidemia, presents to the ED with aphasia. She was last known normal at 9 AM, slept most of the day, was noted by family members around 8:30 PM that patient unable to talk. In the ED, CT head was negative for hemorrhage or mass. Tele stroke was done, patient not a TPA candidate due to outside of therapeutic window. Patient is spontaneously moving al extremities, she is able to comprehend well but unable to express.    Hospital Course:  3/30/17 - Patient 60 yo female admitted for acute CVA confirmed by MRI. Patient with ongoing deficits, evaluated by Speech recommended for NPO. Neurology on consult.  3/31/17 - Patient Swallow study confirming aspiration - NPO recommended. Case discussed with Patient and family - PEG tube recommended. Surgery Advised the patient / Family of the risks and benefits of PEG placement. All acknowledged and accepted plan for placement . Patient having received ASA so the procedure is scheduled for Monday 4/3. Patient further plan for rehab on acceptance. Improving Expression and Movement.  4/1/17 - Patient improving steadily. Speech more intelligible. No complaint beyond her CVA effects.  4/2/17 - Patient with improving speech and affect. Family at bedside. Plan for PEG tomorrow.      Interval History: Improving Clinically    Review of Systems  Objective:     Vital Signs (Most Recent):  Temp: 98.5 °F (36.9 °C) (04/02/17 1200)  Pulse: 80 (04/02/17 1200)  Resp: (!) 2 (04/02/17 1200)  BP: (!) 171/100 (04/02/17 0825)  SpO2: 100 % (04/02/17 1200) Vital Signs (24h Range):  Temp:   [97.9 °F (36.6 °C)-98.5 °F (36.9 °C)] 98.5 °F (36.9 °C)  Pulse:  [67-88] 80  Resp:  [2-28] 2  SpO2:  [96 %-100 %] 100 %  BP: (158-184)/() 171/100     Weight: 87 kg (191 lb 12.8 oz)  Body mass index is 37.46 kg/(m^2).    Intake/Output Summary (Last 24 hours) at 04/02/17 1407  Last data filed at 04/02/17 0600   Gross per 24 hour   Intake             3000 ml   Output             1180 ml   Net             1820 ml      Physical Exam    Significant Labs:   BMP:   Recent Labs  Lab 04/02/17 0615   GLU 53*      K 4.2      CO2 17*   BUN 9   CREATININE 0.7   CALCIUM 8.4*     CBC:   Recent Labs  Lab 04/01/17 0358 04/02/17 0615   WBC 9.31 9.00   HGB 14.4 15.4   HCT 42.6 45.0    174     CMP:   Recent Labs  Lab 04/01/17 0358 04/02/17 0615    138   K 4.0 4.2   * 107   CO2 19* 17*   GLU 54* 53*   BUN 17 9   CREATININE 0.8 0.7   CALCIUM 8.1* 8.4*   PROT 5.7* 6.0   ALBUMIN 3.2* 3.3*   BILITOT 0.9 1.0   ALKPHOS 85 87   AST 24 26   ALT 21 21   ANIONGAP 11 14   EGFRNONAA >60 >60     Cardiac Markers: No results for input(s): CKMB, MYOGLOBIN, BNP, TROPISTAT in the last 48 hours.  Troponin: No results for input(s): TROPONINI in the last 48 hours.  All pertinent labs within the past 24 hours have been reviewed.    Significant Imaging: I have reviewed all pertinent imaging results/findings within the past 24 hours.    Assessment/Plan:      * Acute CVA (cerebrovascular accident)  - - With expressive aphasia  - Hold Aspirin pending PEG placement on Monday, Statin  - MRI Brain, MRA Head/Neck + For CVA  - Rehab consult  - Neurology consult  - NPO - PEG 4/3   - PT/OT/ST  - Allow permissive hypertension    Expressive aphasia  Due to Acute CVA  Neuro  Rehab  ST       Essential hypertension  - Allow permissive hypertension  - Intervene if SBP > 220 and DBP > 120      Tobacco abuse  Counseled on cessation      COPD (chronic obstructive pulmonary disease)  NEBS  O2  Smoking Cessation      VTE Risk Mitigation          Ordered     enoxaparin injection 40 mg  Daily     Route:  Subcutaneous        03/30/17 0013     Medium Risk of VTE  Once      03/30/17 0013     Place sequential compression device  Until discontinued      03/30/17 0013     Reason for No Pharmacological VTE Prophylaxis  Once      03/30/17 0013          Alvin Joseph MD  Department of Hospital Medicine   [facility]

## 2017-04-02 NOTE — PLAN OF CARE
Problem: Patient Care Overview  Goal: Plan of Care Review  PT REQUIRES MAX A X 2 FOR BED MOBILITY AND T/F TO CHAIR.   Outcome: Ongoing (interventions implemented as appropriate)  Pt had an uneventful night. No neuro changes noted. BP 150s-160s / . No acute distress. Pain reported x 1 and controlled with prn medication. NSR noted on monitor. Repositioned q 2 hours. Bed low, side rails up x 2, call bell within reach.

## 2017-04-02 NOTE — PLAN OF CARE
Problem: SLP Goal  Goal: SLP Goal  1. Ongoing swallow assessment with MBSS when warranted to establish safest diet  2. Pt will complete expressive speech tasks with mod cueing for word finding  3. Pt will attend to tasks for 2 minutes with min cueing  4. Pt will complete oral and pharyngeal exs with min cueing   Outcome: Ongoing (interventions implemented as appropriate)  Pt completed expressive language tasks such as naming within sentences and responsive naming with mod a  Tactile and gustatory stimulation provided for timing, coordinating, and generation of spontaneous swallow reflex

## 2017-04-02 NOTE — PLAN OF CARE
Problem: Physical Therapy Goal  Goal: Physical Therapy Goal  PT WILL BE SEEN FOR P.T. FOR A MIN OF 5 OUT OF 7 DAYS A WEEK  LT17  1. PT WILL COMPLETE BED MOBILITY WITH MOD A.   2. PT WILL STAND WITH RW AND MOD A FOR T/F AND PRE-GT  3. PT WILL COMPLETE B LE TE X 10 REPS FOR ROM AND STRENGTHENING.   Outcome: Ongoing (interventions implemented as appropriate)  Mod A bed mobility.  Max A squat pivot transfer bed=chair.  Limited by pain.

## 2017-04-02 NOTE — PT/OT/SLP PROGRESS
Occupational Therapy  Treatment    Christine Mcgill   MRN: [id number]   Admitting Diagnosis: Acute CVA (cerebrovascular accident)    OT Date of Treatment: 17   OT Start Time: 815  OT Stop Time: 840  OT Total Time (min): 25 min    Billable Minutes:  Self Care/Home Management  x 10 min and Therapeutic Activity  x 15 min    General Precautions: Standard, fall  Orthopedic Precautions: N/A  Braces: N/A         Subjective:  Communicated with pt, family and nurse- Lisa prior to session.      Pain Ratin/10  Location - Side: Left     Location: leg  Pain Addressed: Reposition, Distraction, Pre-medicate for activity       Objective:  Patient found with: peripheral IV, telemetry, oxygen     Functional Mobility:  Bed Mobility:  Rolling/Turning to Left: Maximum assistance  Scooting/Bridging: Maximum Assistance  Supine to Sit: Maximum Assistance  Sit to Supine: Maximum Assistance, With assist of 2    Transfers:   Sit <> Stand Assistance: Maximum Assistance (assit of 2)  Sit <> Stand Assistive Device: Rolling Walker  Bed <> Chair Technique: Stand Pivot  Bed <> Chair Assistive Device: No Assistive Device  Chair <>Mat Technique: Squat Pivot  Chair <> Mat Assistance: Maximum Assistance    Functional Ambulation:     Activities of Daily Living:     Feeding adaptive equipment:   UE Dressing Level of Assistance: Maximum assistance  UE adaptive equipment:   LE Dressing Level of Assistance: Total assistance  LE adaptive equipment:   Grooming Position: Seated  Grooming Level of Assistance: Total assistance, Minimum assistance (min a to wash face, total care for oral care. )              Bathing adaptive equipment:     Balance:   Static Sit: POOR+: Needs MINIMAL assist to maintain  Dynamic Sit: FAIR: Cannot move trunk without losing balance  Static Stand: 0: Needs MAXIMAL assist to maintain   Dynamic stand: 0: N/A    Therapeutic Activities and Exercises:  Pt  Completed sit to stand x 2 trials with RW with max A x 2 for max 20 sec,  tactile cues at lissett knees for knee extension, fatigue quickly, seated up in chair for grooming and oral care tasks, increase alertness and focus on tasks. LUE supported on table, PROM ex to LUE as tolerated in pain free range. Pt is cooperative and motivated.     AM-PAC 6 CLICK ADL   How much help from another person does this patient currently need?   1 = Unable, Total/Dependent Assistance  2 = A lot, Maximum/Moderate Assistance  3 = A little, Minimum/Contact Guard/Supervision  4 = None, Modified [place]/Independent          AM-PAC Raw Score CMS G-Code Modifier Level of Impairment Assistance   6 % Total / Unable   7 - 9 CM 80 - 100% Maximal Assist   10 - 14 CL 60 - 80% Moderate Assist   15 - 19 CK 40 - 60% Moderate Assist   20 - 22 CJ 20 - 40% Minimal Assist   23 CI 1-20% SBA / CGA   24 CH 0% Independent/ Mod I     Patient left up in chair with all lines intact, call button in reach and family present    ASSESSMENT:  Christine cMgill is a 61 y.o. female with a medical diagnosis of Acute CVA (cerebrovascular accident) and presents with impaired self care skills and fx mobility.    Rehab identified problem list/impairments: Rehab identified problem list/impairments: weakness, impaired sensation, impaired functional mobilty, gait instability, impaired endurance, impaired self care skills, impaired balance, impaired cognition, decreased coordination, decreased upper extremity function, decreased lower extremity function, pain, decreased safety awareness, abnormal tone, decreased ROM    Rehab potential is good.    Activity tolerance: Good    Discharge recommendations: Discharge Facility/Level Of Care Needs: rehabilitation facility     Barriers to discharge: Barriers to Discharge: Decreased caregiver support    Equipment recommendations: walker, rolling, wheelchair, bath bench, bedside commode     GOALS:   Occupational Therapy Goals        Problem: Occupational Therapy Goal    Goal Priority Disciplines Outcome  Interventions   Occupational Therapy Goal     OT, PT/OT Ongoing (interventions implemented as appropriate)    Description:  ot goals to be met by 4-7-17  1. Pt will tolerate 1 set x 10 reps r ue arom and l ue aarom exercise  2. Min a with ue dressing  3. Min a with le dressing                 Plan:  Patient to be seen 3 x/week to address the above listed problems via self-care/home management, therapeutic activities, therapeutic exercises, neuromuscular re-education  Plan of Care expires: 04/06/17  Plan of Care reviewed with: patient, family         Alex Carney, OT  04/02/2017

## 2017-04-02 NOTE — PLAN OF CARE
Problem: Patient Care Overview  Goal: Plan of Care Review  PT REQUIRES MAX A X 2 FOR BED MOBILITY AND T/F TO CHAIR.   Outcome: Ongoing (interventions implemented as appropriate)  Patient remains free from falls, fall precaution in place. Max assist x2. NPO. PEG placement mon.  VS stable. C/O to L leg, PRN pain med given. IV infusing. Garcia intact, draining.   No other C/O at this time.Call bell and belongings within reach, reminded to call for assistance.

## 2017-04-02 NOTE — PROGRESS NOTES
Call received from Yasmin with  Rehab requesting an update as to whether patient will have PEG on Monday.  SAMEER verified information per progress note of anticipated PEG placement on Monday.  Yasmin requesting call back on Monday from  as patient information will be submitted to insurance for authorization for services.  Kristie Mcgarry LMSW, ELVA-SAMEER, CCM

## 2017-04-03 ENCOUNTER — ANESTHESIA (OUTPATIENT)
Dept: SURGERY | Facility: HOSPITAL | Age: 62
DRG: 065 | End: 2017-04-03
Payer: MEDICAID

## 2017-04-03 ENCOUNTER — SURGERY (OUTPATIENT)
Age: 62
End: 2017-04-03

## 2017-04-03 LAB
ALBUMIN SERPL BCP-MCNC: 3.3 G/DL
ALP SERPL-CCNC: 94 U/L
ALT SERPL W/O P-5'-P-CCNC: 29 U/L
ANION GAP SERPL CALC-SCNC: 13 MMOL/L
AST SERPL-CCNC: 36 U/L
BASOPHILS # BLD AUTO: 0.04 K/UL
BASOPHILS NFR BLD: 0.5 %
BILIRUB SERPL-MCNC: 1.1 MG/DL
BUN SERPL-MCNC: 7 MG/DL
CALCIUM SERPL-MCNC: 8.7 MG/DL
CHLORIDE SERPL-SCNC: 105 MMOL/L
CO2 SERPL-SCNC: 21 MMOL/L
CREAT SERPL-MCNC: 0.7 MG/DL
DIFFERENTIAL METHOD: ABNORMAL
EOSINOPHIL # BLD AUTO: 0.2 K/UL
EOSINOPHIL NFR BLD: 2.7 %
ERYTHROCYTE [DISTWIDTH] IN BLOOD BY AUTOMATED COUNT: 13.1 %
EST. GFR  (AFRICAN AMERICAN): >60 ML/MIN/1.73 M^2
EST. GFR  (NON AFRICAN AMERICAN): >60 ML/MIN/1.73 M^2
GLUCOSE SERPL-MCNC: 97 MG/DL
HCT VFR BLD AUTO: 43.6 %
HGB BLD-MCNC: 15.4 G/DL
LYMPHOCYTES # BLD AUTO: 2.2 K/UL
LYMPHOCYTES NFR BLD: 26.8 %
MCH RBC QN AUTO: 30.8 PG
MCHC RBC AUTO-ENTMCNC: 35.3 %
MCV RBC AUTO: 87 FL
MONOCYTES # BLD AUTO: 1.1 K/UL
MONOCYTES NFR BLD: 13.8 %
NEUTROPHILS # BLD AUTO: 4.5 K/UL
NEUTROPHILS NFR BLD: 56.2 %
PLATELET # BLD AUTO: 163 K/UL
PMV BLD AUTO: 10.3 FL
POTASSIUM SERPL-SCNC: 3.5 MMOL/L
PROT SERPL-MCNC: 6.1 G/DL
RBC # BLD AUTO: 5 M/UL
SODIUM SERPL-SCNC: 139 MMOL/L
WBC # BLD AUTO: 8.02 K/UL

## 2017-04-03 PROCEDURE — 63600175 PHARM REV CODE 636 W HCPCS: Performed by: INTERNAL MEDICINE

## 2017-04-03 PROCEDURE — 27201423 OPTIME MED/SURG SUP & DEVICES STERILE SUPPLY: Performed by: SURGERY

## 2017-04-03 PROCEDURE — 36415 COLL VENOUS BLD VENIPUNCTURE: CPT

## 2017-04-03 PROCEDURE — 71000033 HC RECOVERY, INTIAL HOUR: Performed by: SURGERY

## 2017-04-03 PROCEDURE — 36000705 HC OR TIME LEV I EA ADD 15 MIN: Performed by: SURGERY

## 2017-04-03 PROCEDURE — 80053 COMPREHEN METABOLIC PANEL: CPT

## 2017-04-03 PROCEDURE — 63600175 PHARM REV CODE 636 W HCPCS: Performed by: NURSE PRACTITIONER

## 2017-04-03 PROCEDURE — 37000009 HC ANESTHESIA EA ADD 15 MINS: Performed by: SURGERY

## 2017-04-03 PROCEDURE — 43246 EGD PLACE GASTROSTOMY TUBE: CPT | Mod: ,,, | Performed by: SURGERY

## 2017-04-03 PROCEDURE — 85025 COMPLETE CBC W/AUTO DIFF WBC: CPT

## 2017-04-03 PROCEDURE — 25000003 PHARM REV CODE 250: Performed by: NURSE PRACTITIONER

## 2017-04-03 PROCEDURE — 63600175 PHARM REV CODE 636 W HCPCS: Performed by: SURGERY

## 2017-04-03 PROCEDURE — 25000242 PHARM REV CODE 250 ALT 637 W/ HCPCS: Performed by: INTERNAL MEDICINE

## 2017-04-03 PROCEDURE — 63600175 PHARM REV CODE 636 W HCPCS: Performed by: NURSE ANESTHETIST, CERTIFIED REGISTERED

## 2017-04-03 PROCEDURE — 63600175 PHARM REV CODE 636 W HCPCS: Performed by: ANESTHESIOLOGY

## 2017-04-03 PROCEDURE — 25000003 PHARM REV CODE 250: Performed by: NURSE ANESTHETIST, CERTIFIED REGISTERED

## 2017-04-03 PROCEDURE — 25000003 PHARM REV CODE 250: Performed by: ANESTHESIOLOGY

## 2017-04-03 PROCEDURE — 37000008 HC ANESTHESIA 1ST 15 MINUTES: Performed by: SURGERY

## 2017-04-03 PROCEDURE — 36000704 HC OR TIME LEV I 1ST 15 MIN: Performed by: SURGERY

## 2017-04-03 PROCEDURE — 25000003 PHARM REV CODE 250: Performed by: EMERGENCY MEDICINE

## 2017-04-03 PROCEDURE — 21400001 HC TELEMETRY ROOM

## 2017-04-03 PROCEDURE — 94640 AIRWAY INHALATION TREATMENT: CPT

## 2017-04-03 PROCEDURE — C9113 INJ PANTOPRAZOLE SODIUM, VIA: HCPCS | Performed by: EMERGENCY MEDICINE

## 2017-04-03 PROCEDURE — 63600175 PHARM REV CODE 636 W HCPCS: Performed by: EMERGENCY MEDICINE

## 2017-04-03 PROCEDURE — 0DH63UZ INSERTION OF FEEDING DEVICE INTO STOMACH, PERCUTANEOUS APPROACH: ICD-10-PCS | Performed by: SURGERY

## 2017-04-03 PROCEDURE — 27000221 HC OXYGEN, UP TO 24 HOURS

## 2017-04-03 PROCEDURE — 25000003 PHARM REV CODE 250: Performed by: INTERNAL MEDICINE

## 2017-04-03 RX ORDER — MORPHINE SULFATE 10 MG/ML
2 INJECTION INTRAMUSCULAR; INTRAVENOUS; SUBCUTANEOUS EVERY 5 MIN PRN
Status: DISCONTINUED | OUTPATIENT
Start: 2017-04-03 | End: 2017-04-03 | Stop reason: HOSPADM

## 2017-04-03 RX ORDER — MORPHINE SULFATE 10 MG/ML
2 INJECTION INTRAMUSCULAR; INTRAVENOUS; SUBCUTANEOUS
Status: COMPLETED | OUTPATIENT
Start: 2017-04-03 | End: 2017-04-03

## 2017-04-03 RX ORDER — ROCURONIUM BROMIDE 10 MG/ML
INJECTION, SOLUTION INTRAVENOUS
Status: DISCONTINUED | OUTPATIENT
Start: 2017-04-03 | End: 2017-04-03

## 2017-04-03 RX ORDER — SODIUM CHLORIDE, SODIUM LACTATE, POTASSIUM CHLORIDE, CALCIUM CHLORIDE 600; 310; 30; 20 MG/100ML; MG/100ML; MG/100ML; MG/100ML
INJECTION, SOLUTION INTRAVENOUS CONTINUOUS PRN
Status: DISCONTINUED | OUTPATIENT
Start: 2017-04-03 | End: 2017-04-03

## 2017-04-03 RX ORDER — FENTANYL CITRATE 50 UG/ML
INJECTION, SOLUTION INTRAMUSCULAR; INTRAVENOUS
Status: DISCONTINUED | OUTPATIENT
Start: 2017-04-03 | End: 2017-04-03

## 2017-04-03 RX ORDER — ALBUTEROL SULFATE 90 UG/1
AEROSOL, METERED RESPIRATORY (INHALATION)
Status: DISCONTINUED | OUTPATIENT
Start: 2017-04-03 | End: 2017-04-03

## 2017-04-03 RX ORDER — SODIUM CHLORIDE 9 MG/ML
3 INJECTION, SOLUTION INTRAMUSCULAR; INTRAVENOUS; SUBCUTANEOUS
Status: DISCONTINUED | OUTPATIENT
Start: 2017-04-03 | End: 2017-04-03 | Stop reason: HOSPADM

## 2017-04-03 RX ORDER — SUCCINYLCHOLINE CHLORIDE 20 MG/ML
INJECTION INTRAMUSCULAR; INTRAVENOUS
Status: DISCONTINUED | OUTPATIENT
Start: 2017-04-03 | End: 2017-04-03

## 2017-04-03 RX ORDER — LIDOCAINE HCL/PF 100 MG/5ML
SYRINGE (ML) INTRAVENOUS
Status: DISCONTINUED | OUTPATIENT
Start: 2017-04-03 | End: 2017-04-03

## 2017-04-03 RX ORDER — MEPERIDINE HYDROCHLORIDE 50 MG/ML
12.5 INJECTION INTRAMUSCULAR; INTRAVENOUS; SUBCUTANEOUS ONCE AS NEEDED
Status: DISCONTINUED | OUTPATIENT
Start: 2017-04-03 | End: 2017-04-03 | Stop reason: HOSPADM

## 2017-04-03 RX ORDER — ONDANSETRON 2 MG/ML
INJECTION INTRAMUSCULAR; INTRAVENOUS
Status: DISCONTINUED | OUTPATIENT
Start: 2017-04-03 | End: 2017-04-03

## 2017-04-03 RX ORDER — PROPOFOL 10 MG/ML
VIAL (ML) INTRAVENOUS
Status: DISCONTINUED | OUTPATIENT
Start: 2017-04-03 | End: 2017-04-03

## 2017-04-03 RX ORDER — SODIUM CHLORIDE 9 MG/ML
3 INJECTION, SOLUTION INTRAMUSCULAR; INTRAVENOUS; SUBCUTANEOUS EVERY 8 HOURS
Status: DISCONTINUED | OUTPATIENT
Start: 2017-04-03 | End: 2017-04-03 | Stop reason: HOSPADM

## 2017-04-03 RX ORDER — MICONAZOLE NITRATE 2 %
POWDER (GRAM) TOPICAL 2 TIMES DAILY
Status: DISCONTINUED | OUTPATIENT
Start: 2017-04-03 | End: 2017-04-05 | Stop reason: HOSPADM

## 2017-04-03 RX ADMIN — PROPOFOL 150 MG: 10 INJECTION, EMULSION INTRAVENOUS at 01:04

## 2017-04-03 RX ADMIN — PANTOPRAZOLE SODIUM 40 MG: 40 INJECTION, POWDER, FOR SOLUTION INTRAVENOUS at 09:04

## 2017-04-03 RX ADMIN — IPRATROPIUM BROMIDE AND ALBUTEROL SULFATE 3 ML: .5; 3 SOLUTION RESPIRATORY (INHALATION) at 04:04

## 2017-04-03 RX ADMIN — IPRATROPIUM BROMIDE AND ALBUTEROL SULFATE 3 ML: .5; 3 SOLUTION RESPIRATORY (INHALATION) at 07:04

## 2017-04-03 RX ADMIN — ALBUTEROL SULFATE 2 PUFF: 90 AEROSOL, METERED RESPIRATORY (INHALATION) at 01:04

## 2017-04-03 RX ADMIN — PROPOFOL 50 MG: 10 INJECTION, EMULSION INTRAVENOUS at 01:04

## 2017-04-03 RX ADMIN — MORPHINE SULFATE 2 MG: 2 INJECTION, SOLUTION INTRAMUSCULAR; INTRAVENOUS at 09:04

## 2017-04-03 RX ADMIN — DEXTROSE AND SODIUM CHLORIDE: 5; .9 INJECTION, SOLUTION INTRAVENOUS at 07:04

## 2017-04-03 RX ADMIN — ONDANSETRON 4 MG: 2 INJECTION, SOLUTION INTRAMUSCULAR; INTRAVENOUS at 01:04

## 2017-04-03 RX ADMIN — ROCURONIUM BROMIDE 5 MG: 10 INJECTION, SOLUTION INTRAVENOUS at 01:04

## 2017-04-03 RX ADMIN — ARFORMOTEROL TARTRATE 15 MCG: 15 SOLUTION RESPIRATORY (INHALATION) at 07:04

## 2017-04-03 RX ADMIN — MORPHINE SULFATE 2 MG: 2 INJECTION, SOLUTION INTRAMUSCULAR; INTRAVENOUS at 01:04

## 2017-04-03 RX ADMIN — MORPHINE SULFATE 2 MG: 10 INJECTION, SOLUTION INTRAMUSCULAR; INTRAVENOUS at 12:04

## 2017-04-03 RX ADMIN — BUDESONIDE 0.5 MG: 0.5 SUSPENSION RESPIRATORY (INHALATION) at 07:04

## 2017-04-03 RX ADMIN — IPRATROPIUM BROMIDE AND ALBUTEROL SULFATE 3 ML: .5; 3 SOLUTION RESPIRATORY (INHALATION) at 12:04

## 2017-04-03 RX ADMIN — SODIUM CHLORIDE, SODIUM LACTATE, POTASSIUM CHLORIDE, AND CALCIUM CHLORIDE: 600; 310; 30; 20 INJECTION, SOLUTION INTRAVENOUS at 12:04

## 2017-04-03 RX ADMIN — CEFAZOLIN SODIUM 2 G: 2 SOLUTION INTRAVENOUS at 12:04

## 2017-04-03 RX ADMIN — LIDOCAINE HYDROCHLORIDE 5 ML: 10 INJECTION, SOLUTION EPIDURAL; INFILTRATION; INTRACAUDAL; PERINEURAL at 01:04

## 2017-04-03 RX ADMIN — LIDOCAINE HYDROCHLORIDE 80 MG: 20 INJECTION, SOLUTION INTRAVENOUS at 01:04

## 2017-04-03 RX ADMIN — FENTANYL CITRATE 50 MCG: 50 INJECTION, SOLUTION INTRAMUSCULAR; INTRAVENOUS at 01:04

## 2017-04-03 RX ADMIN — Medication: at 09:04

## 2017-04-03 RX ADMIN — SUCCINYLCHOLINE CHLORIDE 120 MG: 20 INJECTION, SOLUTION INTRAMUSCULAR; INTRAVENOUS at 01:04

## 2017-04-03 NOTE — TRANSFER OF CARE
Anesthesia Transfer of Care Note    Patient: Christine Mcgill    Procedure(s) Performed: Procedure(s) (LRB):  PEG TUBE PLACEMENT/REPLACEMENT (N/A)    Patient location: PACU    Anesthesia Type: general    Transport from OR: Transported from OR on 2-3 L/min O2 by NC with adequate spontaneous ventilation    Post pain: adequate analgesia    Post assessment: no apparent anesthetic complications    Post vital signs: stable    Level of consciousness: sedated    Nausea/Vomiting: no nausea/vomiting    Complications: none          Last vitals:   Visit Vitals    BP (!) 180/91    Pulse (!) 112    Temp 37 °C (98.6 °F) (Temporal)    Resp (!) 24    Ht 5' (1.524 m)    Wt 87 kg (191 lb 12.8 oz)    LMP  (LMP Unknown)    SpO2 98%    Breastfeeding No    BMI 37.46 kg/m2

## 2017-04-03 NOTE — PT/OT/SLP PROGRESS
Speech Language Pathology      Christine Mcgill  MRN: [id number]    Patient not seen today secondary to Unavailable (Comment) (Pt having PEG placed). Will follow-up tomorrow.    Lynsey Yanes, ALCON-SLP

## 2017-04-03 NOTE — ASSESSMENT & PLAN NOTE
Plan for PEG on Monday.   Hold aspirin.  The risks of PEG placement including bleeding, infection were explained to the patient and her family and they expressed understanding.  The nature of the patient's condition, probability of success, risks of refusing treatment, and alternatives and risks of the alternatives were also explained.

## 2017-04-03 NOTE — PLAN OF CARE
Problem: Patient Care Overview  Goal: Plan of Care Review  PT REQUIRES MAX A X 2 FOR BED MOBILITY AND T/F TO CHAIR.   Outcome: Ongoing (interventions implemented as appropriate)  Pt more alert and oriented over night. Speech more clear and easier to understand. Pain controlled with PRN medication. NSR noted on monitor. No falls or injury. Pt repositioned q 2 hours for preventative measures.Side rails up x 2. Family at bedside awaiting today's procedure.

## 2017-04-03 NOTE — H&P (VIEW-ONLY)
[facility] -   General Surgery  Consult Note    Patient Name: Christine Mcgill  MRN: [id number]  Code Status: Full Code  Admission Date: 3/29/2017  Hospital Length of Stay: 2 days  Attending Physician: Colt Olsen MD  Primary Care Provider: Primary Doctor No    Patient information was obtained from relative(s).     Consults  Subjective:     Principal Problem: Acute CVA (cerebrovascular accident)    History of Present Illness: 62 Y/o female stroke patient with expressive aphasia and dysphagia, consulted for PEG.    No current facility-administered medications on file prior to encounter.      No current outpatient prescriptions on file prior to encounter.       Review of patient's allergies indicates:  No Known Allergies    Past Medical History:   Diagnosis Date    COPD (chronic obstructive pulmonary disease)     HTN (hypertension)     Hypercholesteremia      History reviewed. No pertinent surgical history.  Family History     Family history is unknown by patient.        Social History Main Topics    Smoking status: Current Every Day Smoker    Smokeless tobacco: Not on file    Alcohol use No    Drug use: No    Sexual activity: Not on file     Review of Systems   Reason unable to perform ROS: aphasia.     Objective:     Vital Signs (Most Recent):  Temp: 97.8 °F (36.6 °C) (03/31/17 1100)  Pulse: 92 (03/31/17 1237)  Resp: 19 (03/31/17 1237)  BP: (!) 181/79 (03/31/17 1200)  SpO2: 100 % (03/31/17 1237) Vital Signs (24h Range):  Temp:  [97.8 °F (36.6 °C)-98.1 °F (36.7 °C)] 97.8 °F (36.6 °C)  Pulse:  [] 92  Resp:  [17-34] 19  SpO2:  [92 %-100 %] 100 %  BP: (138-208)/() 181/79     Weight: 86.8 kg (191 lb 5.8 oz)  Body mass index is 37.37 kg/(m^2).    Physical Exam   Constitutional: She appears distressed.   Pulmonary/Chest: Effort normal. No respiratory distress.   Musculoskeletal: She exhibits no edema.   Neurological: She is alert.   Skin: No rash noted. No erythema. No pallor.   Psychiatric:  Noncommunicative: due to aphasia.       Assessment/Plan:     * Acute CVA (cerebrovascular accident)  Plan for PEG on Monday.   Hold aspirin (received today).  The risks of PEG placement including bleeding, infection were explained to the patient and her family and they expressed understanding.  The nature of the patient's condition, probability of success, risks of refusing treatment, and alternatives and risks of the alternatives were also explained.     VTE Risk Mitigation         Ordered     enoxaparin injection 40 mg  Daily     Route:  Subcutaneous        03/30/17 0013     Medium Risk of VTE  Once      03/30/17 0013     Place sequential compression device  Until discontinued      03/30/17 0013     Reason for No Pharmacological VTE Prophylaxis  Once      03/30/17 0013          Thank you for your consult. I will follow-up with patient. Please contact us if you have any additional questions.    Louis O. Jeansonne, MD  General Surgery  [facility]

## 2017-04-03 NOTE — PT/OT/SLP PROGRESS
Physical Therapy      Christine Mcgill  MRN: [id number]    PT NOT SEEN. PT OUT OF RM. IN SX FOR PEG TUBE PLACEMENT.     Mariana Dumont, PT 4/3/2017

## 2017-04-03 NOTE — PROGRESS NOTES
[facility] -   General Surgery  Progress Note    Subjective:     History of Present Illness:  60 Y/o female stroke patient with expressive aphasia and dysphagia, consulted for PEG.    Post-Op Info:  Procedure(s) (LRB):  PEG TUBE PLACEMENT/REPLACEMENT (N/A)         Interval History: speech has improved, still needs PEG though    Medications:  Continuous Infusions:   dextrose 5 % and 0.9 % NaCl 50 mL/hr at 04/02/17 1133    sodium chloride 0.9%       Scheduled Meds:   albuterol-ipratropium 2.5mg-0.5mg/3mL  3 mL Nebulization Q4H    arformoterol  15 mcg Nebulization BID    budesonide  0.5 mg Nebulization Q12H    lidocaine (PF) 10 mg/ml (1%)  1 mL Intradermal Once    pantoprazole  40 mg Intravenous Daily    pravastatin  40 mg Oral Daily     PRN Meds:ceFAZolin (ANCEF) IVPB, labetalol, lorazepam, morphine, ondansetron, sodium chloride 0.9%     Review of patient's allergies indicates:  No Known Allergies  Objective:     Vital Signs (Most Recent):  Temp: 98.3 °F (36.8 °C) (04/02/17 1520)  Pulse: 87 (04/02/17 1934)  Resp: 20 (04/02/17 1934)  BP: (!) 170/91 (04/02/17 1520)  SpO2: 96 % (04/02/17 1934) Vital Signs (24h Range):  Temp:  [98 °F (36.7 °C)-98.5 °F (36.9 °C)] 98.3 °F (36.8 °C)  Pulse:  [67-89] 87  Resp:  [2-28] 20  SpO2:  [96 %-100 %] 96 %  BP: (157-171)/() 170/91     Weight: 87 kg (191 lb 12.8 oz)  Body mass index is 37.46 kg/(m^2).    Intake/Output - Last 3 Shifts       03/31 0700 - 04/01 0659 04/01 0700 - 04/02 0659 04/02 0700 - 04/03 0659    P.O.  0     I.V. (mL/kg) 2700 (31) 3000 (34.5) 272.5 (3.1)    Total Intake(mL/kg) 2700 (31) 3000 (34.5) 272.5 (3.1)    Urine (mL/kg/hr) 920 (0.4) 1500 (0.7) 2250 (1.9)    Stool  0 (0)     Total Output 920 1500 2250    Net +1780 +1500 -1977.5           Stool Occurrence  0 x 0 x          Physical Exam   Constitutional: No distress.   Pulmonary/Chest: Effort normal. No respiratory distress.   Musculoskeletal: She exhibits no edema.   Neurological: She  is alert.   Skin: No rash noted. No erythema. No pallor.   Psychiatric: Slurred: improved since Friday.         Assessment/Plan:     * Acute CVA (cerebrovascular accident)  Plan for PEG on Monday.   Hold aspirin.  The risks of PEG placement including bleeding, infection were explained to the patient and her family and they expressed understanding.  The nature of the patient's condition, probability of success, risks of refusing treatment, and alternatives and risks of the alternatives were also explained.       Louis O. Jeansonne, MD  General Surgery  [facility]

## 2017-04-03 NOTE — ANESTHESIA POSTPROCEDURE EVALUATION
Anesthesia Post Evaluation    Patient: Christine Mcgill    Procedure(s) Performed: Procedure(s) (LRB):  PEG TUBE PLACEMENT/REPLACEMENT (N/A)    Final Anesthesia Type: general  Patient location during evaluation: PACU  Patient participation: Yes- Able to Participate  Level of consciousness: awake and alert and oriented  Post-procedure vital signs: reviewed and stable  Pain management: adequate  Airway patency: patent  PONV status at discharge: No PONV  Anesthetic complications: no      Cardiovascular status: blood pressure returned to baseline and hemodynamically stable  Respiratory status: spontaneous ventilation and unassisted  Hydration status: euvolemic  Follow-up not needed.        Visit Vitals    BP (!) 174/86    Pulse 92    Temp 36.7 °C (98.1 °F) (Temporal)    Resp 19    Ht 5' (1.524 m)    Wt 87 kg (191 lb 12.8 oz)    LMP  (LMP Unknown)    SpO2 (!) 92%    Breastfeeding No    BMI 37.46 kg/m2       Pain/Tawanna Score: Pain Assessment Performed: Yes (4/3/2017  2:15 PM)  Presence of Pain: denies (4/3/2017  2:15 PM)  Pain Rating Prior to Med Admin: 5 (4/3/2017 12:21 PM)  Pain Rating Post Med Admin: 2 (4/3/2017 12:31 PM)  Tawanna Score: 8 (4/3/2017  2:15 PM)

## 2017-04-03 NOTE — PROGRESS NOTES
Dr amaya notified repeat b/p after morphine 176/119. Pain decreased from 5 to 3/10. No orders at this time. Will continue to monitor.

## 2017-04-03 NOTE — ANESTHESIA RELEASE NOTE
Anesthesia Release from PACU Note    Patient: Christine Mcgill    Procedure(s) Performed: Procedure(s) (LRB):  PEG TUBE PLACEMENT/REPLACEMENT (N/A)    Anesthesia type: general    Post pain: Adequate analgesia    Post assessment: no apparent anesthetic complications, tolerated procedure well and no evidence of recall    Last Vitals:   Visit Vitals    BP (!) 174/86    Pulse 92    Temp 36.7 °C (98.1 °F) (Temporal)    Resp 19    Ht 5' (1.524 m)    Wt 87 kg (191 lb 12.8 oz)    LMP  (LMP Unknown)    SpO2 (!) 92%    Breastfeeding No    BMI 37.46 kg/m2       Post vital signs: stable    Level of consciousness: awake, alert  and oriented    Nausea/Vomiting: no nausea/no vomiting    Complications: none    Airway Patency: patent    Respiratory: unassisted, spontaneous ventilation    Cardiovascular: stable and blood pressure at baseline    Hydration: euvolemic

## 2017-04-03 NOTE — PLAN OF CARE
Problem: Patient Care Overview  Goal: Plan of Care Review  PT REQUIRES MAX A X 2 FOR BED MOBILITY AND T/F TO CHAIR.   Outcome: Ongoing (interventions implemented as appropriate)  Patient has been doing well since return to the unit. She is resting well in the bed. Garcia and peg tube drainage bags located at the end of the bed. Patient has no complaints of pain and is aware of plan of care. Patient remains confused at time and to certain details. She has had no numbness and tingling today but has not moved her LUE or LLE.

## 2017-04-03 NOTE — ANESTHESIA PREPROCEDURE EVALUATION
04/03/2017  Christine Mcgill is a 61 y.o., female.    [facility] Anesthesia Evaluation    I have reviewed the Patient Summary Reports.    I have reviewed the Nursing Notes.   I have reviewed the Medications.     Review of Systems  Anesthesia Hx:  No problems with previous Anesthesia  Denies Family Hx of Anesthesia complications.   Denies Personal Hx of Anesthesia complications.   Social:  Smoker    Cardiovascular:   Hypertension Denies CAD.    hyperlipidemia ECG has been reviewed. Normal sinus rhythm  Normal ECG  No previous ECGs available  HR 98   Pulmonary:   COPD    Neurological:   CVA, residual symptoms Admitted c ischemic CVA. Presented c expressive aphasia, left weakness. Symptoms partially resolved. Left facial droop.   3/29 carotid uls: Technically limited study but there is no hemodynamically significant alteration of flow identified. No significant stenosis.   Psych:   Pt. Emotional on assessment; crying         Physical Exam  General:  Well nourished    Airway/Jaw/Neck:  Airway Findings: General Airway Assessment: Adult Mallampati: III      Dental:  Dental Findings: Edentulous   Chest/Lungs:  Chest/Lungs Findings: Expiratory Wheezes, Mild  3/29/17  Normal heart size. There is coarsening of the interstitial markings presumably chronic in nature. No consolidation. No effusion     Heart/Vascular:  Heart Findings: Rate: Normal  Other Heart Findings: 3/30/17  1 - Concentric hypertrophy.     2 - Normal left ventricular systolic function (EF 60-65%).     3 - Left ventricular diastolic dysfunction.     4 - Normal right ventricular systolic function .         Mental Status:  Oriented to person, place, time. Slurred speech.       Anesthesia Plan  Type of Anesthesia, risks & benefits discussed:  Anesthesia Type:  general, MAC  Patient's Preference:   Intra-op Monitoring Plan: standard ASA monitors  Intra-op Monitoring  Plan Comments:   Post Op Pain Control Plan:   Post Op Pain Control Plan Comments: Per surgeon  Induction:   IV  Beta Blocker:  Patient is not currently on a Beta-Blocker (No further documentation required).       Informed Consent: Patient representative understands risks and agrees with Anesthesia plan.  Questions answered. Anesthesia consent signed with patient representative.  ASA Score: 3     Day of Surgery Review of History & Physical: I have interviewed and examined the patient. I have reviewed the patient's H&P dated:  There are no significant changes.  H&P update referred to the surgeon.     Anesthesia Plan Notes: Hgb 154., Hct 43.6, Plt 163, K 3.5, Cr 0.7, Glu 97        Ready For Surgery From Anesthesia Perspective.

## 2017-04-03 NOTE — PT/OT/SLP PROGRESS
Occupational Therapy      Christine Mcgill  MRN: [id number]    PT RECEIVING PEG WHICH WARRANTS A 24 HOUR HOLD. WILL FOLLOW UP ON NEXT VISIT    Katerine Patterson, OT   1107  4/3/2017

## 2017-04-03 NOTE — INTERVAL H&P NOTE
I have seen the patient and reviewed this note, and there is no change in history and physical since the last exam.

## 2017-04-03 NOTE — SUBJECTIVE & OBJECTIVE
Interval History: looks comfortable post PEG placement, denies any complaints.      Review of Systems   Constitutional: Positive for activity change and fatigue. Negative for unexpected weight change.   HENT: Negative.  Negative for trouble swallowing.    Eyes: Negative.    Respiratory: Negative.  Negative for cough, shortness of breath and wheezing.    Cardiovascular: Negative.  Negative for chest pain.   Gastrointestinal: Negative.    Endocrine: Negative.    Genitourinary: Negative.    Musculoskeletal: Negative.    Skin: Negative.    Allergic/Immunologic: Negative.    Neurological: Positive for speech difficulty and weakness. Negative for dizziness.   Hematological: Negative.    Psychiatric/Behavioral: Negative.    All other systems reviewed and are negative.    Objective:     Vital Signs (Most Recent):  Temp: 98 °F (36.7 °C) (04/03/17 1525)  Pulse: 74 (04/03/17 1609)  Resp: 18 (04/03/17 1609)  BP: (!) 167/86 (04/03/17 1525)  SpO2: 95 % (04/03/17 1609) Vital Signs (24h Range):  Temp:  [97.1 °F (36.2 °C)-98.8 °F (37.1 °C)] 98 °F (36.7 °C)  Pulse:  [] 74  Resp:  [18-25] 18  SpO2:  [92 %-99 %] 95 %  BP: (160-221)/() 167/86     Weight: 87 kg (191 lb 12.8 oz)  Body mass index is 37.46 kg/(m^2).    Intake/Output Summary (Last 24 hours) at 04/03/17 1706  Last data filed at 04/03/17 1426   Gross per 24 hour   Intake              775 ml   Output             2176 ml   Net            -1401 ml      Physical Exam   Constitutional: She is oriented to person, place, and time. She appears well-developed and well-nourished.   HENT:   Head: Normocephalic and atraumatic.   Eyes: EOM are normal. Pupils are equal, round, and reactive to light.   Neck: Normal range of motion. Neck supple. No JVD present.   Cardiovascular: Normal rate, regular rhythm, normal heart sounds and intact distal pulses.    No murmur heard.  Pulmonary/Chest: No respiratory distress. She has no wheezes.   Abdominal: Soft. Bowel sounds are normal. She  exhibits no distension.   +ve PEG LUQ area-- site clean and dry    Musculoskeletal: Normal range of motion. She exhibits no edema or tenderness.   Neurological: She is alert and oriented to person, place, and time. She has normal reflexes. No cranial nerve deficit. She exhibits abnormal muscle tone. Coordination abnormal.   Skin: Skin is warm and dry. No erythema.   Psychiatric: She has a normal mood and affect.   Nursing note and vitals reviewed.      Significant Labs:    BMP:   Recent Labs  Lab 04/03/17  0551   GLU 97      K 3.5      CO2 21*   BUN 7   CREATININE 0.7   CALCIUM 8.7     CBC:   Recent Labs  Lab 04/02/17  0615 04/03/17  0551   WBC 9.00 8.02   HGB 15.4 15.4   HCT 45.0 43.6    163       All pertinent labs within the past 24 hours have been reviewed.    Significant Imaging: I have reviewed all pertinent imaging results/findings within the past 24 hours.

## 2017-04-03 NOTE — PROGRESS NOTES
Patient has left for Peg tube placement. Patient and family are aware. Heart monitor removed and placed in room.

## 2017-04-03 NOTE — SUBJECTIVE & OBJECTIVE
Interval History: speech has improved, still needs PEG though    Medications:  Continuous Infusions:   dextrose 5 % and 0.9 % NaCl 50 mL/hr at 04/02/17 1133    sodium chloride 0.9%       Scheduled Meds:   albuterol-ipratropium 2.5mg-0.5mg/3mL  3 mL Nebulization Q4H    arformoterol  15 mcg Nebulization BID    budesonide  0.5 mg Nebulization Q12H    lidocaine (PF) 10 mg/ml (1%)  1 mL Intradermal Once    pantoprazole  40 mg Intravenous Daily    pravastatin  40 mg Oral Daily     PRN Meds:ceFAZolin (ANCEF) IVPB, labetalol, lorazepam, morphine, ondansetron, sodium chloride 0.9%     Review of patient's allergies indicates:  No Known Allergies  Objective:     Vital Signs (Most Recent):  Temp: 98.3 °F (36.8 °C) (04/02/17 1520)  Pulse: 87 (04/02/17 1934)  Resp: 20 (04/02/17 1934)  BP: (!) 170/91 (04/02/17 1520)  SpO2: 96 % (04/02/17 1934) Vital Signs (24h Range):  Temp:  [98 °F (36.7 °C)-98.5 °F (36.9 °C)] 98.3 °F (36.8 °C)  Pulse:  [67-89] 87  Resp:  [2-28] 20  SpO2:  [96 %-100 %] 96 %  BP: (157-171)/() 170/91     Weight: 87 kg (191 lb 12.8 oz)  Body mass index is 37.46 kg/(m^2).    Intake/Output - Last 3 Shifts       03/31 0700 - 04/01 0659 04/01 0700 - 04/02 0659 04/02 0700 - 04/03 0659    P.O.  0     I.V. (mL/kg) 2700 (31) 3000 (34.5) 272.5 (3.1)    Total Intake(mL/kg) 2700 (31) 3000 (34.5) 272.5 (3.1)    Urine (mL/kg/hr) 920 (0.4) 1500 (0.7) 2250 (1.9)    Stool  0 (0)     Total Output 920 1500 2250    Net +1780 +1500 -1977.5           Stool Occurrence  0 x 0 x          Physical Exam   Constitutional: No distress.   Pulmonary/Chest: Effort normal. No respiratory distress.   Musculoskeletal: She exhibits no edema.   Neurological: She is alert.   Skin: No rash noted. No erythema. No pallor.   Psychiatric: Slurred: improved since Friday.

## 2017-04-03 NOTE — PLAN OF CARE
Pt resting on stretcher s/p PEG placement to 20 Fr Garcia performed under general anesthesia by Dr. Jeansonne. Respirations even and unlabored on 2L O2 via NC and tolerating well with O2 sats of 91%. PEG site remains c/d/i. Garcia intact draining yellow urine. VSS. See flow sheet for detailed assessment. Will cont to monitor.

## 2017-04-03 NOTE — PROGRESS NOTES
Patient has returned from surgery and peg tube placement. Drainage device attached to peg tube and per MD order nurse is allow to clamp 30 minutes to administer medications. Patient has no complaints of pain. Incision site has dressing in place and appears clean and intact with no bleeding/swelling noted. Patient is awake and alert. Family at the bedside.

## 2017-04-04 LAB
ALBUMIN SERPL BCP-MCNC: 3.1 G/DL
ALP SERPL-CCNC: 89 U/L
ALT SERPL W/O P-5'-P-CCNC: 41 U/L
ANION GAP SERPL CALC-SCNC: 10 MMOL/L
AST SERPL-CCNC: 47 U/L
BASOPHILS # BLD AUTO: 0.04 K/UL
BASOPHILS NFR BLD: 0.4 %
BILIRUB SERPL-MCNC: 1.1 MG/DL
BUN SERPL-MCNC: 7 MG/DL
CALCIUM SERPL-MCNC: 8.6 MG/DL
CHLORIDE SERPL-SCNC: 103 MMOL/L
CO2 SERPL-SCNC: 26 MMOL/L
CREAT SERPL-MCNC: 0.7 MG/DL
DIFFERENTIAL METHOD: ABNORMAL
EOSINOPHIL # BLD AUTO: 0.2 K/UL
EOSINOPHIL NFR BLD: 1.9 %
ERYTHROCYTE [DISTWIDTH] IN BLOOD BY AUTOMATED COUNT: 13.3 %
EST. GFR  (AFRICAN AMERICAN): >60 ML/MIN/1.73 M^2
EST. GFR  (NON AFRICAN AMERICAN): >60 ML/MIN/1.73 M^2
GLUCOSE SERPL-MCNC: 108 MG/DL
HCT VFR BLD AUTO: 44.3 %
HGB BLD-MCNC: 15.2 G/DL
LYMPHOCYTES # BLD AUTO: 1.7 K/UL
LYMPHOCYTES NFR BLD: 18.1 %
MCH RBC QN AUTO: 30.3 PG
MCHC RBC AUTO-ENTMCNC: 34.3 %
MCV RBC AUTO: 88 FL
MONOCYTES # BLD AUTO: 1.4 K/UL
MONOCYTES NFR BLD: 14.8 %
NEUTROPHILS # BLD AUTO: 6.1 K/UL
NEUTROPHILS NFR BLD: 64.8 %
PLATELET # BLD AUTO: 191 K/UL
PMV BLD AUTO: 10.6 FL
POTASSIUM SERPL-SCNC: 3.4 MMOL/L
PROT SERPL-MCNC: 6 G/DL
RBC # BLD AUTO: 5.01 M/UL
SODIUM SERPL-SCNC: 139 MMOL/L
WBC # BLD AUTO: 9.43 K/UL

## 2017-04-04 PROCEDURE — 94640 AIRWAY INHALATION TREATMENT: CPT

## 2017-04-04 PROCEDURE — 36415 COLL VENOUS BLD VENIPUNCTURE: CPT

## 2017-04-04 PROCEDURE — 25000003 PHARM REV CODE 250: Performed by: EMERGENCY MEDICINE

## 2017-04-04 PROCEDURE — 21400001 HC TELEMETRY ROOM

## 2017-04-04 PROCEDURE — 80053 COMPREHEN METABOLIC PANEL: CPT

## 2017-04-04 PROCEDURE — 63600175 PHARM REV CODE 636 W HCPCS: Performed by: INTERNAL MEDICINE

## 2017-04-04 PROCEDURE — 97530 THERAPEUTIC ACTIVITIES: CPT

## 2017-04-04 PROCEDURE — 27000221 HC OXYGEN, UP TO 24 HOURS

## 2017-04-04 PROCEDURE — 97112 NEUROMUSCULAR REEDUCATION: CPT

## 2017-04-04 PROCEDURE — 25000242 PHARM REV CODE 250 ALT 637 W/ HCPCS: Performed by: INTERNAL MEDICINE

## 2017-04-04 PROCEDURE — 63600175 PHARM REV CODE 636 W HCPCS: Performed by: NURSE PRACTITIONER

## 2017-04-04 PROCEDURE — C9113 INJ PANTOPRAZOLE SODIUM, VIA: HCPCS | Performed by: EMERGENCY MEDICINE

## 2017-04-04 PROCEDURE — 85025 COMPLETE CBC W/AUTO DIFF WBC: CPT

## 2017-04-04 PROCEDURE — 92526 ORAL FUNCTION THERAPY: CPT

## 2017-04-04 PROCEDURE — 63600175 PHARM REV CODE 636 W HCPCS: Performed by: EMERGENCY MEDICINE

## 2017-04-04 PROCEDURE — 97803 MED NUTRITION INDIV SUBSEQ: CPT

## 2017-04-04 PROCEDURE — 92507 TX SP LANG VOICE COMM INDIV: CPT

## 2017-04-04 PROCEDURE — 25000003 PHARM REV CODE 250: Performed by: INTERNAL MEDICINE

## 2017-04-04 PROCEDURE — 25000003 PHARM REV CODE 250: Performed by: NURSE PRACTITIONER

## 2017-04-04 PROCEDURE — 94761 N-INVAS EAR/PLS OXIMETRY MLT: CPT

## 2017-04-04 RX ORDER — SYRING-NEEDL,DISP,INSUL,0.3 ML 29 G X1/2"
296 SYRINGE, EMPTY DISPOSABLE MISCELLANEOUS ONCE
Status: COMPLETED | OUTPATIENT
Start: 2017-04-04 | End: 2017-04-04

## 2017-04-04 RX ORDER — DOCUSATE SODIUM 100 MG/1
100 CAPSULE, LIQUID FILLED ORAL DAILY
Status: DISCONTINUED | OUTPATIENT
Start: 2017-04-04 | End: 2017-04-04

## 2017-04-04 RX ORDER — BISACODYL 10 MG
10 SUPPOSITORY, RECTAL RECTAL DAILY PRN
Status: DISCONTINUED | OUTPATIENT
Start: 2017-04-04 | End: 2017-04-05 | Stop reason: HOSPADM

## 2017-04-04 RX ORDER — METOCLOPRAMIDE HYDROCHLORIDE 5 MG/ML
5 INJECTION INTRAMUSCULAR; INTRAVENOUS EVERY 8 HOURS
Status: DISCONTINUED | OUTPATIENT
Start: 2017-04-04 | End: 2017-04-05 | Stop reason: HOSPADM

## 2017-04-04 RX ORDER — AMLODIPINE BESYLATE 5 MG/1
5 TABLET ORAL DAILY
Status: DISCONTINUED | OUTPATIENT
Start: 2017-04-05 | End: 2017-04-05 | Stop reason: HOSPADM

## 2017-04-04 RX ORDER — DOCUSATE SODIUM 50 MG/5ML
100 LIQUID ORAL DAILY
Status: DISCONTINUED | OUTPATIENT
Start: 2017-04-04 | End: 2017-04-05 | Stop reason: HOSPADM

## 2017-04-04 RX ORDER — LISINOPRIL 10 MG/1
10 TABLET ORAL DAILY
Status: DISCONTINUED | OUTPATIENT
Start: 2017-04-04 | End: 2017-04-05 | Stop reason: HOSPADM

## 2017-04-04 RX ADMIN — BUDESONIDE 0.5 MG: 0.5 SUSPENSION RESPIRATORY (INHALATION) at 07:04

## 2017-04-04 RX ADMIN — MAGESIUM CITRATE 296 ML: 1.75 LIQUID ORAL at 02:04

## 2017-04-04 RX ADMIN — PRAVASTATIN SODIUM 40 MG: 20 TABLET ORAL at 08:04

## 2017-04-04 RX ADMIN — IPRATROPIUM BROMIDE AND ALBUTEROL SULFATE 3 ML: .5; 3 SOLUTION RESPIRATORY (INHALATION) at 04:04

## 2017-04-04 RX ADMIN — ARFORMOTEROL TARTRATE 15 MCG: 15 SOLUTION RESPIRATORY (INHALATION) at 07:04

## 2017-04-04 RX ADMIN — IPRATROPIUM BROMIDE AND ALBUTEROL SULFATE 3 ML: .5; 3 SOLUTION RESPIRATORY (INHALATION) at 11:04

## 2017-04-04 RX ADMIN — BISACODYL 10 MG: 10 SUPPOSITORY RECTAL at 02:04

## 2017-04-04 RX ADMIN — Medication: at 08:04

## 2017-04-04 RX ADMIN — DEXTROSE AND SODIUM CHLORIDE: 5; .9 INJECTION, SOLUTION INTRAVENOUS at 09:04

## 2017-04-04 RX ADMIN — MORPHINE SULFATE 2 MG: 2 INJECTION, SOLUTION INTRAMUSCULAR; INTRAVENOUS at 02:04

## 2017-04-04 RX ADMIN — LISINOPRIL 10 MG: 10 TABLET ORAL at 06:04

## 2017-04-04 RX ADMIN — Medication: at 09:04

## 2017-04-04 RX ADMIN — IPRATROPIUM BROMIDE AND ALBUTEROL SULFATE 3 ML: .5; 3 SOLUTION RESPIRATORY (INHALATION) at 12:04

## 2017-04-04 RX ADMIN — IPRATROPIUM BROMIDE AND ALBUTEROL SULFATE 3 ML: .5; 3 SOLUTION RESPIRATORY (INHALATION) at 07:04

## 2017-04-04 RX ADMIN — METOCLOPRAMIDE 5 MG: 5 INJECTION, SOLUTION INTRAMUSCULAR; INTRAVENOUS at 09:04

## 2017-04-04 RX ADMIN — METOCLOPRAMIDE 5 MG: 5 INJECTION, SOLUTION INTRAMUSCULAR; INTRAVENOUS at 03:04

## 2017-04-04 RX ADMIN — MORPHINE SULFATE 2 MG: 2 INJECTION, SOLUTION INTRAMUSCULAR; INTRAVENOUS at 09:04

## 2017-04-04 RX ADMIN — PANTOPRAZOLE SODIUM 40 MG: 40 INJECTION, POWDER, FOR SOLUTION INTRAVENOUS at 08:04

## 2017-04-04 RX ADMIN — MORPHINE SULFATE 2 MG: 2 INJECTION, SOLUTION INTRAMUSCULAR; INTRAVENOUS at 07:04

## 2017-04-04 NOTE — PROGRESS NOTES
[facility] Hospital Medicine  Progress Note    Patient Name: Christine Mcgill  MRN: [id number]  Patient Class: IP- Inpatient   Admission Date: 3/29/2017  Length of Stay: 6 days  Attending Physician: Ana Kennedy MD  Primary Care Provider: Primary Doctor No        Subjective:     Principal Problem:Acute CVA (cerebrovascular accident)    HPI:  Ms. Mcgill is a 62 y/o  female with h/o HTN, hyperlipidemia, presents to the ED with aphasia. She was last known normal at 9 AM, slept most of the day, was noted by family members around 8:30 PM that patient unable to talk. In the ED, CT head was negative for hemorrhage or mass. Tele stroke was done, patient not a TPA candidate due to outside of therapeutic window. Patient is spontaneously moving al extremities, she is able to comprehend well but unable to express.    Hospital Course:  3/30/17 - Patient 62 yo female admitted for acute CVA confirmed by MRI. Patient with ongoing deficits, evaluated by Speech recommended for NPO. Neurology on consult.  3/31/17 - Patient Swallow study confirming aspiration - NPO recommended. Case discussed with Patient and family - PEG tube recommended. Surgery Advised the patient / Family of the risks and benefits of PEG placement. All acknowledged and accepted plan for placement . Patient having received ASA so the procedure is scheduled for Monday 4/3. Patient further plan for rehab on acceptance. Improving Expression and Movement.  4/1/17 - Patient improving steadily. Speech more intelligible. No complaint beyond her CVA effects.  4/2/17 - Patient with improving speech and affect. Family at bedside. Plan for PEG tomorrow.  4/3/17 - seen post op after PEG placement, no new issues. Pain under control.  TF started gradually today, c/o some indigestion but no residual. Reglan added. Also had severe constipation-- relieved by Mag Citrate.      Interval History: looks comfortable post PEG placement, denies any  complaints.      Review of Systems   Constitutional: Positive for activity change and fatigue. Negative for unexpected weight change.   HENT: Negative.  Negative for trouble swallowing.    Eyes: Negative.    Respiratory: Negative.  Negative for cough, shortness of breath and wheezing.    Cardiovascular: Negative.  Negative for chest pain.   Gastrointestinal: Negative.    Endocrine: Negative.    Genitourinary: Negative.    Musculoskeletal: Negative.    Skin: Negative.    Allergic/Immunologic: Negative.    Neurological: Positive for speech difficulty and weakness. Negative for dizziness.   Hematological: Negative.    Psychiatric/Behavioral: Negative.    All other systems reviewed and are negative.    Objective:     Vital Signs (Most Recent):  Temp: 98 °F (36.7 °C) (04/03/17 1525)  Pulse: 74 (04/03/17 1609)  Resp: 18 (04/03/17 1609)  BP: (!) 167/86 (04/03/17 1525)  SpO2: 95 % (04/03/17 1609) Vital Signs (24h Range):  Temp:  [97.1 °F (36.2 °C)-98.8 °F (37.1 °C)] 98 °F (36.7 °C)  Pulse:  [] 74  Resp:  [18-25] 18  SpO2:  [92 %-99 %] 95 %  BP: (160-221)/() 167/86     Weight: 87 kg (191 lb 12.8 oz)  Body mass index is 37.46 kg/(m^2).    Intake/Output Summary (Last 24 hours) at 04/03/17 1706  Last data filed at 04/03/17 1426   Gross per 24 hour   Intake              775 ml   Output             2176 ml   Net            -1401 ml      Physical Exam   Constitutional: She is oriented to person, place, and time. She appears well-developed and well-nourished.   HENT:   Head: Normocephalic and atraumatic.   Eyes: EOM are normal. Pupils are equal, round, and reactive to light.   Neck: Normal range of motion. Neck supple. No JVD present.   Cardiovascular: Normal rate, regular rhythm, normal heart sounds and intact distal pulses.    No murmur heard.  Pulmonary/Chest: No respiratory distress. She has no wheezes.   Abdominal: Soft. Bowel sounds are normal. She exhibits no distension.   +ve PEG LUQ area-- site clean and dry     Musculoskeletal: Normal range of motion. She exhibits no edema or tenderness.   Neurological: She is alert and oriented to person, place, and time. She has normal reflexes. No cranial nerve deficit. She exhibits abnormal muscle tone. Coordination abnormal.   Skin: Skin is warm and dry. No erythema.   Psychiatric: She has a normal mood and affect.   Nursing note and vitals reviewed.      Significant Labs:    BMP:   Recent Labs  Lab 04/03/17  0551   GLU 97      K 3.5      CO2 21*   BUN 7   CREATININE 0.7   CALCIUM 8.7     CBC:   Recent Labs  Lab 04/02/17  0615 04/03/17  0551   WBC 9.00 8.02   HGB 15.4 15.4   HCT 45.0 43.6    163       All pertinent labs within the past 24 hours have been reviewed.    Significant Imaging: I have reviewed all pertinent imaging results/findings within the past 24 hours.    Assessment/Plan:      * Acute CVA (cerebrovascular accident)  - - With expressive aphasia  - Hold Aspirin pending PEG placement on Monday, Statin  - MRI Brain, MRA Head/Neck + For CVA  - Rehab consult  - Neurology consult  - NPO - PEG 4/3   - PT/OT/ST  - Allow permissive hypertension    Improving gradually, speech slowly picking  TF started today  Start BP meds gradually as BP gets quite high-- pt also very anxious and still has crying spells.    Expressive aphasia  Due to Acute CVA  Neuro  Rehab  ST     Improving slowly  Continue ST    Essential hypertension  - Allow permissive hypertension  - Intervene if SBP > 220 and DBP > 120  Resume BP meds gradually via PEG      Tobacco abuse  Counseled on cessation      COPD (chronic obstructive pulmonary disease)  NEBS  O2  Smoking Cessation  Stable    VTE Risk Mitigation         Ordered     Medium Risk of VTE  Once      03/30/17 0013     Place sequential compression device  Until discontinued      03/30/17 0013     Reason for No Pharmacological VTE Prophylaxis  Once      03/30/17 0013          Ana Kennedy MD  Department of Hospital Medicine   [facility]

## 2017-04-04 NOTE — PLAN OF CARE
Problem: Patient Care Overview  Goal: Plan of Care Review  PT REQUIRES MAX A X 2 FOR BED MOBILITY AND T/F TO CHAIR.   Outcome: Ongoing (interventions implemented as appropriate)  Pt on O2 tolerating well.

## 2017-04-04 NOTE — PT/OT/SLP PROGRESS
Speech Language Pathology  Treatment    Christine Mcgill   MRN: [id number]   Admitting Diagnosis: Acute CVA (cerebrovascular accident)    Diet recommendations: Solid Diet Level: NPO  Liquid Diet Level: NPO     SLP Treatment Date: 04/04/17  Speech Start Time: 1045     Speech Stop Time: 1115     Speech Total (min): 30 min       TREATMENT BILLABLE MINUTES:  Speech Therapy Individual 15 and Treatment Swallowing Dysfunction 15    Has the patient been evaluated by SLP for swallowing? : Yes  Keep patient NPO?: Yes   General Precautions: Standard, aphasia, aspiration, fall  Current Respiratory Status: nasal cannula       Subjective:  Co-treat with PT and OT.  Pt is awake and cooperative.     Pain Rating:  (pt reported left arm and leg pain throughout therapy session, however did not rate)           Pain Addressed: Distraction, Reposition       Objective:   Patient found with: telemetry, peripheral IV, oxygen  Pt participated in 1 step directions with 100% acc, 2 step directions with 80% acc, object ID with 55% acc, automatic speech was produced with dysarthria.  Oral motor ex's with max verbal and visual cueing provided by ST, ST provided mod cueing for pt to swallow her secretions.  Pt with less emotional outburst and was able to be redirected when she did cry.       Assessment:  Christine Mcgill is a 61 y.o. female with a medical diagnosis of Acute CVA (cerebrovascular accident) and presents with dysphagia, aphasia, dyarthria.    Discharge recommendations: Discharge Facility/Level Of Care Needs: rehabilitation facility     Goals:   SLP Goals        Problem: SLP Goal    Goal Priority Disciplines Outcome   SLP Goal     SLP Ongoing (interventions implemented as appropriate)   Description:  1. Ongoing swallow assessment with MBSS when warranted to establish safest diet  2. Pt will complete expressive speech tasks with mod cueing for word finding  3. Pt will attend to tasks for 2 minutes with min cueing  4. Pt will complete oral and  pharyngeal ex's with min cueing                 Plan:   Patient to be seen Therapy Frequency: 5 x/week   Plan of Care expires: 04/06/17  Plan of Care reviewed with: patient  SLP Follow-up?: Yes              Mignon Vaughn CCC-SLP  04/04/2017

## 2017-04-04 NOTE — PT/OT/SLP PROGRESS
Physical Therapy  Treatment    Christine Mcgill   MRN: [id number]   Admitting Diagnosis: Acute CVA (cerebrovascular accident)    PT Received On: 17  PT Start Time: 1112     PT Stop Time: 1136    PT Total Time (min): 24 min       Billable Minutes:  Therapeutic Activity 14 and Neuromuscular Re-education 10    Treatment Type: Treatment  PT/PTA: PT             General Precautions: Standard, fall  Orthopedic Precautions: N/A   Braces:           Subjective:  Communicated with NURSE NICE AND EPIC CHART REVIEW prior to session.  PT AGREED TO TX     Pain Ratin/10  Location - Side: Left     Location: arm (l le)     Pain Rating Post-Intervention: 5/10    Objective:   Patient found with: telemetry, peripheral IV, oxygen    Functional Mobility:  Bed Mobility:   Rolling/Turning to Left: Moderate assistance (X2)  Scooting/Bridging: Maximum Assistance  Supine to Sit: Moderate Assistance (X2)    Transfers:  Sit <> Stand Assistance: Maximum Assistance (X2)  Sit <> Stand Assistive Device: No Assistive Device  Bed <> Chair Technique: Squat Pivot  Bed <> Chair Assistance: Total Assistance (X2)    Gait:   Gait Distance: UNABLE    Therapeutic Activities and Exercises:  PT SEATED EOB FOR ROM AND TACTILE CUES FOR QUAD AND HAMSTRING FACILITATION. PT WITH NON-PURPOSEFUL  L LE MOVEMENT AND INC TONE IN L LE. PT WITH LEFT SIDE SITTING FOR WB THROUGH L UE AND BALANCE TRAINING WITH REACHING TASK IN ALL PLANES WITH MIN A TO MAINTAIN BALANCE. PT SIT>STAND X 3 TRIALS WITH MAX A X 2 AND TACTILE CUES TO QUAD AND GLUTS FOR ERECT POSTURE. PT RETURNED SEATED EOB FOR REST. PT T/F TO CHAIR WITH TOTAL A X 2. PT  PRESENT AND NURSE AWARE PT UP IN CHAIR.     AM-PAC 6 CLICK MOBILITY  How much help from another person does this patient currently need?   1 = Unable, Total/Dependent Assistance  2 = A lot, Maximum/Moderate Assistance  3 = A little, Minimum/Contact Guard/Supervision  4 = None, Modified [place]/Independent         AM-PAC Raw Score  CMS G-Code Modifier Level of Impairment Assistance   6 % Total / Unable   7 - 9 CM 80 - 100% Maximal Assist   10 - 14 CL 60 - 80% Moderate Assist   15 - 19 CK 40 - 60% Moderate Assist   20 - 22 CJ 20 - 40% Minimal Assist   23 CI 1-20% SBA / CGA   24 CH 0% Independent/ Mod I     Patient left up in chair with call button in reach.    Assessment:  PT ZEB TX WITH MAX CUES TO COMPLETE TASK. PT EMOTIONAL STATE MORE CONTAINED THROUGHOUT TX TODAY. PT WITH DEC SAFETY. PT WILL CONT TO BENEFIT FROM P.T. TO ADDRESS IMPAIRMENTS.     Rehab identified problem list/impairments: Rehab identified problem list/impairments: weakness, impaired endurance, impaired self care skills, impaired functional mobilty, gait instability, impaired balance, pain, decreased safety awareness, decreased lower extremity function, decreased upper extremity function, decreased coordination, impaired cognition, decreased ROM, impaired fine motor    Rehab potential is good.    Activity tolerance: Fair    Discharge recommendations: Discharge Facility/Level Of Care Needs: rehabilitation facility     Barriers to discharge: Barriers to Discharge: Decreased caregiver support    Equipment recommendations:       GOALS:   Physical Therapy Goals        Problem: Physical Therapy Goal    Goal Priority Disciplines Outcome Goal Variances Interventions   Physical Therapy Goal     PT/OT, PT Ongoing (interventions implemented as appropriate)     Description:  PT WILL BE SEEN FOR P.T. FOR A MIN OF 5 OUT OF 7 DAYS A WEEK  LT17  1. PT WILL COMPLETE BED MOBILITY WITH MOD A.   2. PT WILL STAND WITH RW AND MOD A FOR T/F AND PRE-GT  3. PT WILL COMPLETE B LE TE X 10 REPS FOR ROM AND STRENGTHENING.                PLAN:    Patient to be seen  (pt will be seen a min of 5-7 days as tolerated)  to address the above listed problems via gait training, therapeutic activities, therapeutic exercises, neuromuscular re-education, wheelchair management/training  Plan of Care  expires: 04/06/17  Plan of Care reviewed with: patient         Mariana Dumont, PT  04/04/2017

## 2017-04-04 NOTE — PLAN OF CARE
Problem: Patient Care Overview  Goal: Plan of Care Review  PT REQUIRES MAX A X 2 FOR BED MOBILITY AND T/F TO CHAIR.   Outcome: Ongoing (interventions implemented as appropriate)  Patient has had a bowel movement since medication given this afternoon. Patient's tube feed is currently paused due to patient complaining of feeling like the tube feed was moving up her throat. Patient has not coughed up any tube feed or vomited. Her head has remained <30 degrees. This RN believes that patient is having GI distress due to medication given to encourage bowel movement and she is describing her discomfort. Patient has been agreeing to turning and repositioning. Patient and family are aware of transfer to rehab facility tomorrow.

## 2017-04-04 NOTE — ASSESSMENT & PLAN NOTE
- - With expressive aphasia  - Hold Aspirin pending PEG placement on Monday, Statin  - MRI Brain, MRA Head/Neck + For CVA  - Rehab consult  - Neurology consult  - NPO - PEG 4/3   - PT/OT/ST  - Allow permissive hypertension    Improving gradually, speech slowly picking  TF started today  Start BP meds gradually as BP gets quite high-- pt also very anxious and still has crying spells.

## 2017-04-04 NOTE — PT/OT/SLP PROGRESS
Occupational Therapy  Treatment    Christine Mcgill   MRN: [id number]   Admitting Diagnosis: Acute CVA (cerebrovascular accident)    OT Date of Treatment: 17   OT Start Time: 1030  OT Stop Time: 1100  OT Total Time (min): 30 min    Billable Minutes:  Therapeutic Activity 30 MINUTES    General Precautions: Standard, fall  Orthopedic Precautions: N/A  Braces:           Subjective:  Communicated with NURSE NICE AND EPIC CHART REVIEW prior to session.      Pain Ratin/10  Location - Side: Left  Location - Orientation: upper  Location: arm (LEFT LEG)          Objective:  Patient found with: telemetry, peripheral IV, oxygen     Functional Mobility:  Bed Mobility:  Rolling/Turning to Left: Moderate assistance, With assist of 2  Rolling/Turning Right: Moderate assistance, With assist of 2  Scooting/Bridging: Moderate Assistance, With assist of 2  Supine to Sit: Moderate Assistance, With assist of 2  Sit to Supine: Moderate Assistance, With assist of 2    Transfers:   Sit <> Stand Assistance: Maximum Assistance (X2)  Sit <> Stand Assistive Device:  (HAND HELD ASSIST)  Bed <> Chair Technique: Stand Pivot  Bed <> Chair Transfer Assistance: Total Assistance  Bed <> Chair Assistive Device: No Assistive Device (HAND HELD ASSIST)    Functional Ambulation: NA    Activities of Daily Living:     Feeding adaptive equipment: NA     UE adaptive equipment: NA     LE adaptive equipment: NA                    Bathing adaptive equipment: NA    Balance:   Static Sit: POOR+: Needs MINIMAL assist to maintain  Dynamic Sit: POOR+  Static Stand: 0: Needs MAXIMAL assist to maintain   Dynamic stand: 0: N/A    Therapeutic Activities and Exercises:  PT RECEIVED WEIGHTBEARING TECHNIQUE LEFT UE/LE REACHING VARIOUS PLANES AND RANGES. PT REQ MIN A AND MAX VC'S TO ASSIST TO RETURN TO MIDLINE. PT REQ MAX A X 2 WITH SIT<>STAND ACTIVITY X 3 ATTEMPTS.  PT EDUCATED ON RIGHT SELF ASSIST LEFT UE ROM EXERCISE. PT TEARFUL AT TIMES.     AM-PAC 6 CLICK ADL    How much help from another person does this patient currently need?   1 = Unable, Total/Dependent Assistance  2 = A lot, Maximum/Moderate Assistance  3 = A little, Minimum/Contact Guard/Supervision  4 = None, Modified [place]/Independent          AM-PAC Raw Score CMS G-Code Modifier Level of Impairment Assistance   6 % Total / Unable   7 - 9 CM 80 - 100% Maximal Assist   10 - 14 CL 60 - 80% Moderate Assist   15 - 19 CK 40 - 60% Moderate Assist   20 - 22 CJ 20 - 40% Minimal Assist   23 CI 1-20% SBA / CGA   24 CH 0% Independent/ Mod I     Patient left up in chair with all lines intact, call button in reach, NURSE TEX notified and SPOUSE present    ASSESSMENT:  Christine Mcgill is a 61 y.o. female with a medical diagnosis of Acute CVA (cerebrovascular accident) and presents with IMPAIRED ADL'S, IMPAIRED FUNCTIONAL MOBILITY, IMPAIRED T/F'S AND DECREASE L UE/LE STRENGTH/ ENDURAN    Rehab identified problem list/impairments: Rehab identified problem list/impairments: weakness, impaired functional mobilty, impaired balance, impaired sensation, impaired endurance, impaired self care skills, gait instability, decreased coordination, visual deficits, decreased upper extremity function, decreased lower extremity function, pain, decreased ROM, decreased safety awareness    Rehab potential is good.    Activity tolerance: Fair    Discharge recommendations: Discharge Facility/Level Of Care Needs: rehabilitation facility     Barriers to discharge: Barriers to Discharge: Decreased caregiver support, Inaccessible home environment    Equipment recommendations:  (PLATFORM WALKER; DROP ARE BSC, W/C )     GOALS:   Occupational Therapy Goals        Problem: Occupational Therapy Goal    Goal Priority Disciplines Outcome Interventions   Occupational Therapy Goal     OT, PT/OT Ongoing (interventions implemented as appropriate)    Description:  ot goals to be met by 4-7-17  1. Pt will tolerate 1 set x 10 reps r ue arom and l  ue aarom exercise  2. Min a with ue dressing  3. Min a with le dressing                 Plan:  Patient to be seen 3 x/week to address the above listed problems via self-care/home management, therapeutic activities, therapeutic exercises  Plan of Care expires: 04/06/17  Plan of Care reviewed with: patient         Katerine Marinellizier, OT  04/04/2017

## 2017-04-04 NOTE — PLAN OF CARE
Problem: Occupational Therapy Goal  Goal: Occupational Therapy Goal  ot goals to be met by 4-7-17  1. Pt will tolerate 1 set x 10 reps r ue arom and l ue aarom exercise  2. Min a with ue dressing  3. Min a with le dressing   Outcome: Ongoing (interventions implemented as appropriate)  PT EDUCATED ON SELF ASSIST OF LEFT UE ROM EXERCISE, PT WORKING TOWARD SITTING BALANCE REACHING VARIOUS RANGE AND PLANES

## 2017-04-04 NOTE — PLAN OF CARE
CM placed call to Inna with [facility], patient acceptance remains pending insurance authorization, stable medical status(must tolerate tube feeding, must have bowel movement) and bed availability. States will have an available bed in the am 4/5/17. No noted initiation of tube feeding documented. And no BM document x 2 days. CM discussed pending factors with  and floor RN Loyda.

## 2017-04-04 NOTE — OP NOTE
Operative Note       SURGERY DATE:  4/3/2017     PRE-OP DIAGNOSIS:  Expressive aphasia [R47.01]  Ischemic stroke [I63.9]    POST-OP DIAGNOSIS:  Expressive aphasia [R47.01]  Ischemic stroke [I63.9]    Procedure(s) (LRB):  PEG TUBE PLACEMENT/REPLACEMENT (N/A)    Surgeon(s) and Role:     * Louis O. Jeansonne IV, MD - Primary    ASSISTANT:  None    TASKS PERFORMED BY ASSISTANT:  No assistant    ANESTHESIA: General    FINDINGS: Tube in good position    GRAFTS/IMPLANTS:  PEG tube    ESTIMATED BLOOD LOSS: 1 mL              COMPLICATIONS:  None    SPECIMEN:  None    INDICATION:  Dysphagia.    DESCRIPTION OF PROCEDURE:    The patient was placed under general anesthesia.  Pre-op antibiotics were given within an hour before the incision and SCD's were placed for DVT prophylaxis.    An EGD was performed; the esophagus, stomach, and duodenum were normal in appearance.  The stomach was inflated.  A suitable position on the abdomen was chosen based on transillumination and direct palpation.  An angiocath was inserted under direct visualization.  A guidewire was inserted through the angiocath, and was grasped with the snare.  It was brought out through the mouth and attached to the end of the PEG tube.  Traction was then placed on the guidewire on the abdominal side, bringing the PEG out through the abdominal wall.  The disc of the PEG was visualized in the stomach and noted to be snug but able to freely rotate.  A sterile dressing was applied.    The patient tolerated the procedure well and was taken to PACU in stable condition.            CONDITION: Good    DISPOSITION: PACU - hemodynamically stable.

## 2017-04-04 NOTE — ASSESSMENT & PLAN NOTE
- Allow permissive hypertension  - Intervene if SBP > 220 and DBP > 120  Resume BP meds gradually via PEG

## 2017-04-04 NOTE — CONSULTS
[facility] -   Adult Nutrition  Consult Note    SUMMARY     Recommendations  Recommendation/Intervention: 1. Continue tubefeed as tolerated   Goals: Tolerance of nutrition support at goal rate  Nutrition Goal Status: new  Communication of RD Recs: discussed on rounds    Nutrition Discharge Plan  Cardiac diet with consistency per SLP if she becomes able.  Enteral Nutrition until able to tolerate oral diet (Isosource 1.5 at 45ml/hr with flushes 200ml every 6 hours)  If patient would prefer to go home on bolus tubefeed consider Isosource 1.5 bolus 1 can every 6 hours plus an additional 1/2 can at 1 of her feedings (4.5 cans total daily) flushes 50ml before and after plus 100ml TID between feedings.    Reason for Assessment  Reason for Assessment: RD follow-up  Diagnosis:  (CVA, Aphasia)  Relevent Medical History: COPD, HTN, Hypercholesterolemia   Interdisciplinary Rounds: attended  General Information Comments: Patient had PEG placed yesterday 4/3. Tubefeed ordered this afternoon per RD recommendations    Nutrition Prescription Ordered  Current Diet Order: NPO     Nutrition Risk Screen  Nutrition Risk Screen: dysphagia or difficulty swallowing    Nutrition/Diet History  Typical Food/Fluid Intake: unable to obtain    Labs/Tests/Procedures/Meds  Pertinent Labs Reviewed: reviewed  Pertinent Labs Comments: K 3.4, Alb 3.1  Pertinent Medications Reviewed: reviewed    Physical Findings  Overall Physical Appearance: obese  Oral/Mouth Cavity: WDL  Skin:  (incision)    Anthropometrics  Height (inches): 60 in  Weight Method: Bed Scale  Weight (kg): 86.8 kg  Ideal Body Weight (IBW), Female: 100 lb  % Ideal Body Weight, Female (lb): 191.36 lb  BMI (kg/m2): 37.37  BMI Grade: 35 - 39.9 - obesity - grade II    Estimated/Assessed Needs  Weight Used For Calorie Calculations: 86.8 kg (191 lb 5.8 oz)   Height (cm): 152.4 cm  Energy Need Method: [place]-St Jeor (x1-1.2= 7317-3975)  Weight Used For Protein Calculations: 86.8  kg (191 lb 5.8 oz)  0.8 gm Protein (gm): 69.59 and 1.0 gm Protein (gm): 86.98  Fluid Need Method: RDA Method (1ml/[place] or as needed)    Monitor and Evaluation  Food and Nutrient Intake: energy intake, enteral nutrition intake  Food and Nutrient Adminstration: enteral and parenteral nutrition administration, diet order  Anthropometric Measurements: weight  Biochemical Data, Medical Tests and Procedures: electrolyte and renal panel, glucose/endocrine profile  Nutrition-Focused Physical Findings: overall appearance    Nutrition Follow-Up  RD Follow-up?: Yes (2x weekly)    Assessment and Plan  * Acute CVA (cerebrovascular accident)  Nutrition Problem:   Inadequate energy intake    Etiology/Related to:   Swallowing difficulty    As evidenced by:  Inability to safely consume oral diet with no nutrition support    Treatment Strategy:   1. Enteral nutrition until oral diet is achieved (Isosource 1.5 at 45ml/hr with 200ml flush every 6 hours)    Nutrition Diagnosis Status:   New

## 2017-04-04 NOTE — PLAN OF CARE
Problem: Patient Care Overview  Goal: Plan of Care Review  PT REQUIRES MAX A X 2 FOR BED MOBILITY AND T/F TO CHAIR.   Outcome: Ongoing (interventions implemented as appropriate)  Recommendation/Intervention: 1. Continue tubefeed as tolerated   Goals: Tolerance of nutrition support at goal rate  Nutrition Goal Status: new  Communication of RD Recs: discussed on rounds

## 2017-04-04 NOTE — PROGRESS NOTES
Pt showed signs of weakness when asked to move left side when given directions during assessment. Family encouraged similar movements, pt was able to follow commands without difficulty.   Pt Arom intact with Left arm and leg.

## 2017-04-04 NOTE — PLAN OF CARE
Problem: SLP Goal  Goal: SLP Goal  1. Ongoing swallow assessment with MBSS when warranted to establish safest diet  2. Pt will complete expressive speech tasks with mod cueing for word finding  3. Pt will attend to tasks for 2 minutes with min cueing  4. Pt will complete oral and pharyngeal exs with min cueing   Outcome: Ongoing (interventions implemented as appropriate)  Pt participated in ST with increased verbalizations and decreased crying episodes.

## 2017-04-04 NOTE — PLAN OF CARE
Problem: Patient Care Overview  Goal: Plan of Care Review  PT REQUIRES MAX A X 2 FOR BED MOBILITY AND T/F TO CHAIR.   Outcome: Ongoing (interventions implemented as appropriate)  PT REQUIRES MAX A X 2 FOR STAND AND T/F TO CHAIR TOTAL A X 2.

## 2017-04-05 VITALS
RESPIRATION RATE: 22 BRPM | DIASTOLIC BLOOD PRESSURE: 84 MMHG | HEART RATE: 87 BPM | WEIGHT: 189.19 LBS | HEIGHT: 60 IN | OXYGEN SATURATION: 94 % | SYSTOLIC BLOOD PRESSURE: 137 MMHG | TEMPERATURE: 99 F | BODY MASS INDEX: 37.15 KG/M2

## 2017-04-05 PROBLEM — Z72.0 TOBACCO ABUSE: Status: RESOLVED | Noted: 2017-03-30 | Resolved: 2017-04-05

## 2017-04-05 PROBLEM — I63.9 ACUTE CVA (CEREBROVASCULAR ACCIDENT): Status: RESOLVED | Noted: 2017-03-29 | Resolved: 2017-04-05

## 2017-04-05 LAB
ALBUMIN SERPL BCP-MCNC: 3.2 G/DL
ALP SERPL-CCNC: 92 U/L
ALT SERPL W/O P-5'-P-CCNC: 55 U/L
ANION GAP SERPL CALC-SCNC: 12 MMOL/L
AST SERPL-CCNC: 48 U/L
BASOPHILS # BLD AUTO: 0.06 K/UL
BASOPHILS NFR BLD: 0.6 %
BILIRUB SERPL-MCNC: 1 MG/DL
BUN SERPL-MCNC: 12 MG/DL
CALCIUM SERPL-MCNC: 9.1 MG/DL
CHLORIDE SERPL-SCNC: 102 MMOL/L
CO2 SERPL-SCNC: 28 MMOL/L
CREAT SERPL-MCNC: 0.7 MG/DL
DIFFERENTIAL METHOD: ABNORMAL
EOSINOPHIL # BLD AUTO: 0.3 K/UL
EOSINOPHIL NFR BLD: 2.9 %
ERYTHROCYTE [DISTWIDTH] IN BLOOD BY AUTOMATED COUNT: 13.4 %
EST. GFR  (AFRICAN AMERICAN): >60 ML/MIN/1.73 M^2
EST. GFR  (NON AFRICAN AMERICAN): >60 ML/MIN/1.73 M^2
GLUCOSE SERPL-MCNC: 96 MG/DL
HCT VFR BLD AUTO: 44.4 %
HGB BLD-MCNC: 15.2 G/DL
LYMPHOCYTES # BLD AUTO: 2.6 K/UL
LYMPHOCYTES NFR BLD: 26.9 %
MCH RBC QN AUTO: 30.5 PG
MCHC RBC AUTO-ENTMCNC: 34.2 %
MCV RBC AUTO: 89 FL
MONOCYTES # BLD AUTO: 1.2 K/UL
MONOCYTES NFR BLD: 12.3 %
NEUTROPHILS # BLD AUTO: 5.6 K/UL
NEUTROPHILS NFR BLD: 57.3 %
PLATELET # BLD AUTO: 197 K/UL
PMV BLD AUTO: 10.4 FL
POTASSIUM SERPL-SCNC: 3.4 MMOL/L
PROT SERPL-MCNC: 6.1 G/DL
RBC # BLD AUTO: 4.98 M/UL
SODIUM SERPL-SCNC: 142 MMOL/L
WBC # BLD AUTO: 9.68 K/UL

## 2017-04-05 PROCEDURE — 36415 COLL VENOUS BLD VENIPUNCTURE: CPT

## 2017-04-05 PROCEDURE — 25000003 PHARM REV CODE 250: Performed by: EMERGENCY MEDICINE

## 2017-04-05 PROCEDURE — 63600175 PHARM REV CODE 636 W HCPCS: Performed by: EMERGENCY MEDICINE

## 2017-04-05 PROCEDURE — 63600175 PHARM REV CODE 636 W HCPCS: Performed by: INTERNAL MEDICINE

## 2017-04-05 PROCEDURE — 99024 POSTOP FOLLOW-UP VISIT: CPT | Mod: ,,, | Performed by: SURGERY

## 2017-04-05 PROCEDURE — 63600175 PHARM REV CODE 636 W HCPCS: Performed by: NURSE PRACTITIONER

## 2017-04-05 PROCEDURE — 94761 N-INVAS EAR/PLS OXIMETRY MLT: CPT

## 2017-04-05 PROCEDURE — C9113 INJ PANTOPRAZOLE SODIUM, VIA: HCPCS | Performed by: EMERGENCY MEDICINE

## 2017-04-05 PROCEDURE — 97110 THERAPEUTIC EXERCISES: CPT

## 2017-04-05 PROCEDURE — 25000242 PHARM REV CODE 250 ALT 637 W/ HCPCS: Performed by: INTERNAL MEDICINE

## 2017-04-05 PROCEDURE — 25000003 PHARM REV CODE 250: Performed by: NURSE PRACTITIONER

## 2017-04-05 PROCEDURE — 80053 COMPREHEN METABOLIC PANEL: CPT

## 2017-04-05 PROCEDURE — 85025 COMPLETE CBC W/AUTO DIFF WBC: CPT

## 2017-04-05 PROCEDURE — 97530 THERAPEUTIC ACTIVITIES: CPT

## 2017-04-05 PROCEDURE — 94640 AIRWAY INHALATION TREATMENT: CPT

## 2017-04-05 PROCEDURE — 27000221 HC OXYGEN, UP TO 24 HOURS

## 2017-04-05 RX ORDER — METOCLOPRAMIDE 5 MG/1
5 TABLET ORAL
Qty: 10 TABLET | Refills: 1 | Status: SHIPPED | OUTPATIENT
Start: 2017-04-05

## 2017-04-05 RX ORDER — MICONAZOLE NITRATE 2 %
POWDER (GRAM) TOPICAL 2 TIMES DAILY
Refills: 0 | COMMUNITY
Start: 2017-04-05

## 2017-04-05 RX ORDER — ATORVASTATIN CALCIUM 20 MG/1
20 TABLET, FILM COATED ORAL DAILY
Qty: 30 TABLET | Refills: 1
Start: 2017-04-05 | End: 2018-04-05

## 2017-04-05 RX ORDER — FAMOTIDINE 20 MG/1
20 TABLET, FILM COATED ORAL 2 TIMES DAILY
Qty: 60 TABLET | Refills: 1
Start: 2017-04-05 | End: 2018-04-05

## 2017-04-05 RX ORDER — DOCUSATE SODIUM 50 MG/5ML
100 LIQUID ORAL DAILY
Refills: 0 | COMMUNITY
Start: 2017-04-05

## 2017-04-05 RX ORDER — AMLODIPINE BESYLATE 5 MG/1
5 TABLET ORAL DAILY
Qty: 30 TABLET | Refills: 1
Start: 2017-04-05 | End: 2018-04-05

## 2017-04-05 RX ADMIN — Medication: at 10:04

## 2017-04-05 RX ADMIN — MORPHINE SULFATE 2 MG: 2 INJECTION, SOLUTION INTRAMUSCULAR; INTRAVENOUS at 06:04

## 2017-04-05 RX ADMIN — ARFORMOTEROL TARTRATE 15 MCG: 15 SOLUTION RESPIRATORY (INHALATION) at 07:04

## 2017-04-05 RX ADMIN — PRAVASTATIN SODIUM 40 MG: 20 TABLET ORAL at 10:04

## 2017-04-05 RX ADMIN — IPRATROPIUM BROMIDE AND ALBUTEROL SULFATE 3 ML: .5; 3 SOLUTION RESPIRATORY (INHALATION) at 04:04

## 2017-04-05 RX ADMIN — PANTOPRAZOLE SODIUM 40 MG: 40 INJECTION, POWDER, FOR SOLUTION INTRAVENOUS at 10:04

## 2017-04-05 RX ADMIN — LISINOPRIL 10 MG: 10 TABLET ORAL at 10:04

## 2017-04-05 RX ADMIN — BUDESONIDE 0.5 MG: 0.5 SUSPENSION RESPIRATORY (INHALATION) at 07:04

## 2017-04-05 RX ADMIN — METOCLOPRAMIDE 5 MG: 5 INJECTION, SOLUTION INTRAMUSCULAR; INTRAVENOUS at 06:04

## 2017-04-05 RX ADMIN — IPRATROPIUM BROMIDE AND ALBUTEROL SULFATE 3 ML: .5; 3 SOLUTION RESPIRATORY (INHALATION) at 07:04

## 2017-04-05 RX ADMIN — IPRATROPIUM BROMIDE AND ALBUTEROL SULFATE 3 ML: .5; 3 SOLUTION RESPIRATORY (INHALATION) at 11:04

## 2017-04-05 RX ADMIN — AMLODIPINE BESYLATE 5 MG: 5 TABLET ORAL at 10:04

## 2017-04-05 NOTE — PT/OT/SLP PROGRESS
Physical Therapy  Treatment    Christine Mcgill   MRN: [id number]   Admitting Diagnosis: Acute CVA (cerebrovascular accident)    PT Received On: 17  PT Start Time: 0955     PT Stop Time: 1020    PT Total Time (min): 25 min       Billable Minutes:  Therapeutic Activity 15 and Therapeutic Exercise 10    Treatment Type: Treatment  PT/PTA: PT             General Precautions: Standard, aphasia, fall  Orthopedic Precautions: N/A   Braces:           Subjective:  Communicated with NURSE PRICE AND EPIC CHART REVIEW  prior to session.   PT AGREED TO TX     Pain Ratin/10  Location - Side: Left     Location: elbow     Pain Rating Post-Intervention: 4/10    Objective:   Patient found with: telemetry, peripheral IV, oxygen    Functional Mobility:  Bed Mobility:   Rolling/Turning to Left: Moderate assistance  Supine to Sit: Moderate Assistance  Sit to Supine: Moderate Assistance    Transfers:  Sit <> Stand Assistance: Activity did not occur    Gait:   Gait Distance: UNABLE    Therapeutic Activities and Exercises:  PT SUP.SIT EOB WITH MOD A. PT WITH TACTILE CUES TO L UE AND LE FOR ROM TE WITH ACTIVE ROM NOTED WITH TKE. PT WITH INC TONE IN L LE. PT WITH R LATERAL LEAN THUS MOVED TO LEFT SIDE SIT ON ELBOW WITH REACHING TASK TO CHALLENGE BALANCE. PT BALANCE PROGRESSING WITH MIDLINE ORIENTATION. PT GIVEN CUES FOR SCANNING ENVIRONMENT TO  LEFT SIDE. PT SUP IN BED AND SCOOTED TO HOB WITH MOD A. PT FAMILY EDUCATED ON ROM EXERCISES FOR L LE AND UE. PT LEFT WITH ALL NEEDS MET     AM-PAC 6 CLICK MOBILITY  How much help from another person does this patient currently need?   1 = Unable, Total/Dependent Assistance  2 = A lot, Maximum/Moderate Assistance  3 = A little, Minimum/Contact Guard/Supervision  4 = None, Modified [place]/Independent         AM-PAC Raw Score CMS G-Code Modifier Level of Impairment Assistance   6 % Total / Unable   7 - 9 CM 80 - 100% Maximal Assist   10 - 14 CL 60 - 80% Moderate Assist   15 - 19 CK 40 -  60% Moderate Assist   20 - 22 CJ 20 - 40% Minimal Assist   23 CI 1-20% SBA / CGA   24 CH 0% Independent/ Mod I     Patient left supine with call button in reach and FAMILY present.    Assessment:  PT ZEB TX WITH EPISODE OF BEING EMOTIONAL LABILE. PT WITH MINIMAL PURPOSEFUL ROM OF L LE WITH TKE. PT WILL CONT TO BENEFIT FROM P.T. TO ADDRESS IMPAIRMENTS    Rehab identified problem list/impairments: Rehab identified problem list/impairments: weakness, impaired functional mobilty, impaired balance, impaired cognition, impaired endurance, impaired self care skills, gait instability, decreased coordination, decreased lower extremity function, decreased upper extremity function, decreased safety awareness, pain, decreased ROM, abnormal tone, impaired coordination    Rehab potential is good.    Activity tolerance: Fair    Discharge recommendations: Discharge Facility/Level Of Care Needs: rehabilitation facility     Barriers to discharge: Barriers to Discharge: Decreased caregiver support    Equipment recommendations:       GOALS:   Physical Therapy Goals        Problem: Physical Therapy Goal    Goal Priority Disciplines Outcome Goal Variances Interventions   Physical Therapy Goal     PT/OT, PT Ongoing (interventions implemented as appropriate)     Description:  PT WILL BE SEEN FOR P.T. FOR A MIN OF 5 OUT OF 7 DAYS A WEEK  LT17  1. PT WILL COMPLETE BED MOBILITY WITH MOD A.   2. PT WILL STAND WITH RW AND MOD A FOR T/F AND PRE-GT  3. PT WILL COMPLETE B LE TE X 10 REPS FOR ROM AND STRENGTHENING.                PLAN:    Patient to be seen  (pt will be seen a min of 5-7 days as tolerated)  to address the above listed problems via gait training, therapeutic activities, therapeutic exercises  Plan of Care expires: 17  Plan of Care reviewed with: patient         Mariana Dumont, PT  2017

## 2017-04-05 NOTE — PLAN OF CARE
Problem: Patient Care Overview  Goal: Plan of Care Review  PT REQUIRES MAX A X 2 FOR BED MOBILITY AND T/F TO CHAIR.   Outcome: Ongoing (interventions implemented as appropriate)  Patient/family updated on POC, demonstrated teach back  Pain managed with morphine  Monitor sr paced  VSS   Patient turned q 2 hours  Fall precautions in place,bed locked,lowest position;call bell within reach  Bed alarm  Pt remained free from falls; no resp. Distress noted;aspiration precautions;hob elevated;tube feeding increased to 20ml/hr

## 2017-04-05 NOTE — PROGRESS NOTES
General Surgery Progress Note    SUBJECTIVE:    Tolerating tube feeds    OBJECTIVE:    Vital signs and intake/output reviewed.     Physical Exam:   General: no distress   Abdomen: appropriately tender to palpation, no peritoneal signs  PEG site intact    Labs and images reviewed.    ASSESSMENT/PLAN:    Active Hospital Problems    Diagnosis  POA    *Acute CVA (cerebrovascular accident) [I63.9]  Yes    COPD (chronic obstructive pulmonary disease) [J44.9]  Yes    Tobacco abuse [Z72.0]  Yes    Expressive aphasia [R47.01]  Yes    Essential hypertension [I10]  Yes      Resolved Hospital Problems    Diagnosis Date Resolved POA   No resolved problems to display.        Continue current treatment, follow up in gen surg clinic when tube is ready to be removed.

## 2017-04-05 NOTE — PLAN OF CARE
MITUL placed call to Inna with [facility] authorization received from insurance and patient may transition to Rehab facility today. Request report call to Marco A(nursing supervisor)-[phone] @11:00 am. Request current MAR and discharge summary fax before patient leaves this facility. Also request if patient has a alvarez, that alvarez is removed prior to discharge. If patient requires alvarez, then MD must include order for alvarez with discharge summary.  This information given to floor nurse Remedios and . MITUL also updated family.

## 2017-04-05 NOTE — PLAN OF CARE
Problem: Patient Care Overview  Goal: Plan of Care Review  PT REQUIRES MAX A X 2 FOR BED MOBILITY AND T/F TO CHAIR.   Outcome: Ongoing (interventions implemented as appropriate)  o2 sat on nc 3l/m=93%; tolerates txs well.

## 2017-04-05 NOTE — PLAN OF CARE
Problem: Patient Care Overview  Goal: Plan of Care Review  PT REQUIRES MAX A X 2 FOR BED MOBILITY AND T/F TO CHAIR.   Outcome: Ongoing (interventions implemented as appropriate)  PT REQUIRES MOD A FOR BED MOBILITY

## 2017-04-05 NOTE — PLAN OF CARE
[id number] [employer] transportation request form faxed; fax confirmation received; Current MAR, discharge summary,and discharge order with order to leave alvarez in place faxed to [facility],-[phone], fax confirmation received.

## 2017-04-05 NOTE — NURSING
Pt discharged per MD order. [employer] [facility] transported pt via wheelchair to their van without incident. IV was removed but alvarez remained per MD order. Pt was transferred to [facility].

## 2017-04-06 NOTE — DISCHARGE SUMMARY
[facility] Hospital Medicine  Discharge Summary      Patient Name: Christine Mcgill  MRN: [id number]  Admission Date: 3/29/2017  Hospital Length of Stay: 7 days  Discharge Date and Time:  04/05/2017 10:58 PM  Attending Physician: Veronica att. providers found   Discharging Provider: Ana Kennedy MD  Primary Care Provider: Primary Doctor Veronica      HPI:   Ms. Mcgill is a 62 y/o  female with h/o HTN, hyperlipidemia, presents to the ED with aphasia. She was last known normal at 9 AM, slept most of the day, was noted by family members around 8:30 PM that patient unable to talk. In the ED, CT head was negative for hemorrhage or mass. Tele stroke was done, patient not a TPA candidate due to outside of therapeutic window. Patient is spontaneously moving al extremities, she is able to comprehend well but unable to express.    Procedure(s) (LRB):  PEG TUBE PLACEMENT/REPLACEMENT (N/A)      Indwelling Lines/Drains at time of discharge:   Lines/Drains/Airways     Drain                 Urethral Catheter 03/30/17 0301 6 days         Gastrostomy/Enterostomy 04/03/17 Percutaneous endoscopic gastrostomy (PEG) LUQ feeding 2 days              Hospital Course:   Ms. Mcgill is a 62 y/o  female with h/o HTN, hyperlipidemia, presents to the ED with aphasia, acute CVA confirmed by MRI. Patient with ongoing deficits, evaluated by Speech recommended for NPO. Neurology on consult.- Patient Swallow study confirming aspiration - pt kept NPO recommended. Case discussed with Patient and family - PEG tube recommended. Surgery Advised the patient / Family of the risks and benefits of PEG placement. All acknowledged and accepted plan for placement . Patient having received ASA so the procedure is scheduled for Monday 4/3. Patient further plan for rehab on acceptance. Improving Expression and Movement. Patient improving steadily. Speech more intelligible. No complaint beyond her CVA effects. PEG placed on 4/3. TF started next  day and tolerated well. c/o some indigestion but no residual. Reglan added. Also had severe constipation-- relieved by Mag Citrate. Pt was accepted by [facility]. Pt seen and examined and deemed stable to be discharged today to Rehab with alvarez and Tube feeding.     Consults:   Consults         Status Ordering Provider     Inpatient consult to Neurology  Once     Provider:  Leonardo Marlow MD    Completed AJ SANON JR     Inpatient consult to Pulmonology  Once     Provider:  (Not yet assigned)    Completed HALI HUNTER     Inpatient consult to Telemedicine-Stroke  Once     Provider:  Aldo Culp MD    Completed AJ SANON JR     IP consult to case management/social work  Once     Provider:  (Not yet assigned)    Completed HALI HUNTER     IP consult to dietary  Once     Provider:  (Not yet assigned)    Completed HALI HUNTER          Significant Diagnostic Studies: Labs:   BMP:   Recent Labs  Lab 04/04/17  0517 04/05/17  0613    96    142   K 3.4* 3.4*    102   CO2 26 28   BUN 7 12   CREATININE 0.7 0.7   CALCIUM 8.6* 9.1   , CMP   Recent Labs  Lab 04/04/17  0517 04/05/17  0613    142   K 3.4* 3.4*    102   CO2 26 28    96   BUN 7 12   CREATININE 0.7 0.7   CALCIUM 8.6* 9.1   PROT 6.0 6.1   ALBUMIN 3.1* 3.2*   BILITOT 1.1* 1.0   ALKPHOS 89 92   AST 47* 48*   ALT 41 55*   ANIONGAP 10 12   ESTGFRAFRICA >60 >60   EGFRNONAA >60 >60   , CBC   Recent Labs  Lab 04/04/17  0517 04/05/17 0613   WBC 9.43 9.68   HGB 15.2 15.2   HCT 44.3 44.4    197    and All labs within the past 24 hours have been reviewed    Imaging Results         Fl Modified Barium Swallow Speech (Final result) Result time:  03/31/17 14:24:49    Final result by Junior Kwon Jr., MD (03/31/17 14:24:49)    Impression:         As above.      Electronically signed by: JUNIOR KWON M.D.  Date:     03/31/17  Time:    14:24     Narrative:    Modified barium swallow    Clinical  Indication:     Dysphagia    Findings: Fluoroscopic assistance was given to the speech pathology department to perform a modified barium swallow. No diagnostic images submitted for radiologic interpretation. Please see their report for details.  1 minute of fluoroscopic time.  One image was obtained.            MRI Brain Without Contrast (Final result) Result time:  03/30/17 12:48:50    Procedure changed from MRI Brain W WO Contrast        Final result by Caden Hanna MD (03/30/17 12:48:50)    Impression:     Acute CVA in the medial aspect of the left thalamus and in the right anterior aspect of the britney and centrally within the britney.  Only a diffusion scan was able to be performed on this patient.      Electronically signed by: CADEN HANNA MD  Date:     03/30/17  Time:    12:48     Narrative:    MRI without intravenous contrast.    Indication: Cerebral infarction.  Left hemiparesis.    Technique: A diffusion sequence only was performed.  The patient was uncooperative with the exam even after receiving sedation.    Findings: The diffusion sequence shows an acute small focal infarct in the medial aspect of the left thalamus.  The diffusion scan also shows an acute infarct in the right anterior aspect of the britney and a tiny focal acute infarct in the midline central aspect of the britney.            US Carotid Bilateral (Final result) Result time:  03/29/17 23:56:04    Final result by Marshall Fonseca III, MD (03/29/17 23:56:04)    Impression:     Technically limited study but there is no hemodynamically significant alteration of flow identified. No significant stenosis.      Electronically signed by: MARSHALL FONSECA MD  Date:     03/29/17  Time:    23:56     Narrative:    Bilateral duplex carotid sludge vertebral examination.    Clinical indication: Stroke.    Examination was technically very difficult due to patient motion and inability to hold still. Both vertebrals were well visualized and demonstrate  antegrade flow.        The peak systolic velocity in the right internal carotid was 66 cm/s with a systolic ratio of 1.4. The peak systolic velocity of the left internal carotid with 78 cm/s with the systolic velocity ratio of 1.1. There is no significant atheromatous change suggested.            X-Ray Chest AP Portable (Final result) Result time:  03/29/17 22:15:13    Final result by Lino Fonseca III, MD (03/29/17 22:15:13)    Impression:    . Normal heart size. Clear lungs with chronic changes suggested.      Electronically signed by: LINO FONSECA MD  Date:     03/29/17  Time:    22:15     Narrative:    Chest x-ray, single view.    Clinical indication: Stroke.    Normal heart size. There is coarsening of the interstitial markings presumably chronic in nature. No consolidation. No effusion.            CT Head Without Contrast (Final result) Result time:  03/29/17 22:07:44    Final result by Lino Fonseca III, MD (03/29/17 22:07:44)    Impression:     No bleed or other acute intracranial event suggested.        I immediately called these results to the emergency room physician at the time of the interpretation as requested.    All CT scans at this facility use dose modulation, iterative reconstruction, and/or weight based dosing when appropriate to reduce radiation dose to as low as reasonably achievable.      Electronically signed by: LINO FONSECA MD  Date:     03/29/17  Time:    22:07     Narrative:    CT of the head without contrast.    Clinical indication: aphasia.     Standard noncontrast CT scan of the brain. No previous for comparison.    The brain and ventricles show mild changes of atrophy. The scan is slightly degraded by motion artifact. Minimal white matter changes are present. No hemorrhage, mass lesion, or hydrocephalus.   No depressed skull fracture.              Pending Diagnostic Studies:     None        Final Active Diagnoses:    Diagnosis Date Noted POA    COPD (chronic  obstructive pulmonary disease) [J44.9] 04/01/2017 Yes    Expressive aphasia [R47.01] 03/29/2017 Yes    Essential hypertension [I10] 03/29/2017 Yes      Problems Resolved During this Admission:    Diagnosis Date Noted Date Resolved POA    PRINCIPAL PROBLEM:  Acute CVA (cerebrovascular accident) [I63.9] 03/29/2017 04/05/2017 Yes    Tobacco abuse [Z72.0] 03/30/2017 04/05/2017 Yes      No new Assessment & Plan notes have been filed under this hospital service since the last note was generated.  Service: Hospital Medicine      Discharged Condition: stable    Disposition: Rehab Facility    Follow Up:  Follow-up Information     Follow up with Leonardo Marlow MD In 2 weeks.    Specialty:  Neurology    Contact information:    [address]  [place] [id number] [phone]          Follow up with Louis O. Jeansonne, MD.    Specialty:  General Surgery    Why:  As needed for PEG removal in future--    Contact information:    [id number] [place] [address] [address] [place] [address] [phone]          Patient Instructions:     Diet general   Scheduling Instructions: Isosource 1.5 at 45 cc / hr via PEG, flush with water 100 cc q 4 hourly. Check for residual q 6 h and Hold for residual > 100 cc   Order Specific Question Answer Comments   Na restriction, if any: 2gNa    Additional restrictions: Cardiac (Low Na/Chol)      Activity as tolerated       Medications:  Reconciled Home Medications:   Discharge Medication List as of 4/5/2017 12:53 PM      START taking these medications    Details   amlodipine (NORVASC) 5 MG tablet Take 1 tablet (5 mg total) by mouth once daily., Starting 4/5/2017, Until Thu 4/5/18, No Print      atorvastatin (LIPITOR) 20 MG tablet Take 1 tablet (20 mg total) by mouth once daily., Starting 4/5/2017, Until Thu 4/5/18, No Print      docusate (COLACE) 50 mg/5 mL liquid Take 10 mLs (100 mg total) by mouth once daily., Starting 4/5/2017, Until Discontinued, OTC      famotidine (PEPCID) 20 MG tablet Take 1  tablet (20 mg total) by mouth 2 (two) times daily., Starting 4/5/2017, Until Thu 4/5/18, No Print      metoclopramide HCl (REGLAN) 5 MG tablet Take 1 tablet (5 mg total) by mouth 3 (three) times daily before meals., Starting 4/5/2017, Until Discontinued, Print      miconazole NITRATE 2 % (MICOTIN) 2 % top powder Apply topically 2 (two) times daily., Starting 4/5/2017, Until Discontinued, OTC         CONTINUE these medications which have NOT CHANGED    Details   albuterol (PROVENTIL) 2.5 mg /3 mL (0.083 %) nebulizer solution Take 2.5 mg by nebulization every 6 (six) hours as needed for Wheezing. Rescue, Until Discontinued, Historical Med      fluticasone-vilanterol (BREO ELLIPTA) 100-25 mcg/dose diskus inhaler Inhale 1 puff into the lungs once daily. Controller, Until Discontinued, Historical Med      lisinopril (PRINIVIL,ZESTRIL) 20 MG tablet Take 20 mg by mouth once daily., Until Discontinued, Historical Med      tiotropium (SPIRIVA WITH HANDIHALER) 18 mcg inhalation capsule Inhale 18 mcg into the lungs once daily. Controller, Until Discontinued, Historical Med         STOP taking these medications       pravastatin (PRAVACHOL) 20 MG tablet Comments:   Reason for Stopping:             Time spent on the discharge of patient: 51 minutes    Ana Kennedy MD  Department of Hospital Medicine  [facility]

## 2024-06-27 NOTE — PLAN OF CARE
2nd attempt to reach out to pt. Message left for pt to call the office to schedule her mammogram.      Electronically signed by ROLANDA PANIAGUA MA on 6/27/24 at 9:13 AM EDT     Problem: SLP Goal  Goal: SLP Goal  1. Ongoing swallow assessment with MBSS when warranted to establish safest diet  2. Pt will complete expressive speech tasks with mod cueing for word finding  3. Pt will attend to tasks for 2 minutes with min cueing  4. Pt will complete oral and pharyngeal exs with min cueing   Outcome: Ongoing (interventions implemented as appropriate)  Pt not safe for po intake.

## (undated) DEVICE — GLOVE BIOGEL ECLIPSE SZ 7.5

## (undated) DEVICE — MANIFOLD 4 PORT

## (undated) DEVICE — SEE MEDLINE ITEM 157027

## (undated) DEVICE — SEE MEDLINE ITEM 157148

## (undated) DEVICE — GAUZE SPONGE 4X4 12PLY

## (undated) DEVICE — SEE MEDLINE ITEM 157117

## (undated) DEVICE — SEE MEDLINE ITEM 152622

## (undated) DEVICE — SYR 10CC LUER LOCK

## (undated) DEVICE — ELECTRODE REM PLYHSV RETURN 9

## (undated) DEVICE — SOL NS 1000CC

## (undated) DEVICE — LINER GLOVE POWDERFREE 8

## (undated) DEVICE — NDL SAFETY 25G X 1.5 ECLIPSE

## (undated) DEVICE — COVER OVERHEAD SURG LT BLUE

## (undated) DEVICE — KIT ENDOVIVE SAF PEG PULL 20FR

## (undated) DEVICE — APPLICATOR CHLORAPREP ORN 26ML